# Patient Record
Sex: FEMALE | Race: WHITE | NOT HISPANIC OR LATINO | Employment: STUDENT | URBAN - METROPOLITAN AREA
[De-identification: names, ages, dates, MRNs, and addresses within clinical notes are randomized per-mention and may not be internally consistent; named-entity substitution may affect disease eponyms.]

---

## 2023-06-16 ENCOUNTER — APPOINTMENT (OUTPATIENT)
Dept: ULTRASOUND IMAGING | Facility: CLINIC | Age: 43
End: 2023-06-16
Attending: EMERGENCY MEDICINE
Payer: COMMERCIAL

## 2023-06-16 ENCOUNTER — HOSPITAL ENCOUNTER (EMERGENCY)
Facility: CLINIC | Age: 43
Discharge: HOME | End: 2023-06-16
Attending: EMERGENCY MEDICINE | Admitting: EMERGENCY MEDICINE
Payer: COMMERCIAL

## 2023-06-16 VITALS
OXYGEN SATURATION: 99 % | SYSTOLIC BLOOD PRESSURE: 110 MMHG | TEMPERATURE: 98 F | HEART RATE: 69 BPM | DIASTOLIC BLOOD PRESSURE: 55 MMHG | WEIGHT: 125 LBS | RESPIRATION RATE: 17 BRPM

## 2023-06-16 DIAGNOSIS — R10.11 ABDOMINAL PAIN, RIGHT UPPER QUADRANT: ICD-10-CM

## 2023-06-16 LAB
ALBUMIN SERPL-MCNC: 4.2 G/DL (ref 3.5–5)
ALP SERPL-CCNC: 49 U/L (ref 45–120)
ALT SERPL W P-5'-P-CCNC: 17 U/L (ref 0–45)
ANION GAP SERPL CALCULATED.3IONS-SCNC: 8 MMOL/L (ref 5–18)
AST SERPL W P-5'-P-CCNC: 20 U/L (ref 0–40)
BASOPHILS # BLD AUTO: 0 10E3/UL (ref 0–0.2)
BASOPHILS NFR BLD AUTO: 0 %
BILIRUB DIRECT SERPL-MCNC: 0.2 MG/DL
BILIRUB SERPL-MCNC: 0.6 MG/DL (ref 0–1)
BUN SERPL-MCNC: 10 MG/DL (ref 8–22)
CALCIUM SERPL-MCNC: 9.5 MG/DL (ref 8.5–10.5)
CHLORIDE BLD-SCNC: 104 MMOL/L (ref 98–107)
CO2 SERPL-SCNC: 28 MMOL/L (ref 22–31)
CREAT SERPL-MCNC: 0.75 MG/DL (ref 0.6–1.1)
EOSINOPHIL # BLD AUTO: 0.1 10E3/UL (ref 0–0.7)
EOSINOPHIL NFR BLD AUTO: 2 %
ERYTHROCYTE [DISTWIDTH] IN BLOOD BY AUTOMATED COUNT: 13.5 % (ref 10–15)
GFR SERPL CREATININE-BSD FRML MDRD: >90 ML/MIN/1.73M2
GLUCOSE BLD-MCNC: 83 MG/DL (ref 70–125)
HCG SERPL QL: NEGATIVE
HCT VFR BLD AUTO: 37.6 % (ref 35–47)
HGB BLD-MCNC: 12.7 G/DL (ref 11.7–15.7)
HOLD SPECIMEN: NORMAL
IMM GRANULOCYTES # BLD: 0 10E3/UL
IMM GRANULOCYTES NFR BLD: 1 %
LIPASE SERPL-CCNC: 53 U/L (ref 0–52)
LYMPHOCYTES # BLD AUTO: 1.7 10E3/UL (ref 0.8–5.3)
LYMPHOCYTES NFR BLD AUTO: 20 %
MCH RBC QN AUTO: 31.6 PG (ref 26.5–33)
MCHC RBC AUTO-ENTMCNC: 33.8 G/DL (ref 31.5–36.5)
MCV RBC AUTO: 94 FL (ref 78–100)
MONOCYTES # BLD AUTO: 0.5 10E3/UL (ref 0–1.3)
MONOCYTES NFR BLD AUTO: 6 %
NEUTROPHILS # BLD AUTO: 6 10E3/UL (ref 1.6–8.3)
NEUTROPHILS NFR BLD AUTO: 71 %
NRBC # BLD AUTO: 0 10E3/UL
NRBC BLD AUTO-RTO: 0 /100
PLATELET # BLD AUTO: 253 10E3/UL (ref 150–450)
POTASSIUM BLD-SCNC: 3.6 MMOL/L (ref 3.5–5)
PROT SERPL-MCNC: 6.6 G/DL (ref 6–8)
RBC # BLD AUTO: 4.02 10E6/UL (ref 3.8–5.2)
SODIUM SERPL-SCNC: 140 MMOL/L (ref 136–145)
WBC # BLD AUTO: 8.4 10E3/UL (ref 4–11)

## 2023-06-16 PROCEDURE — 84703 CHORIONIC GONADOTROPIN ASSAY: CPT | Performed by: EMERGENCY MEDICINE

## 2023-06-16 PROCEDURE — 82248 BILIRUBIN DIRECT: CPT | Performed by: EMERGENCY MEDICINE

## 2023-06-16 PROCEDURE — 80053 COMPREHEN METABOLIC PANEL: CPT | Performed by: EMERGENCY MEDICINE

## 2023-06-16 PROCEDURE — 99285 EMERGENCY DEPT VISIT HI MDM: CPT | Mod: 25

## 2023-06-16 PROCEDURE — 76705 ECHO EXAM OF ABDOMEN: CPT

## 2023-06-16 PROCEDURE — C9113 INJ PANTOPRAZOLE SODIUM, VIA: HCPCS | Performed by: EMERGENCY MEDICINE

## 2023-06-16 PROCEDURE — 85025 COMPLETE CBC W/AUTO DIFF WBC: CPT | Performed by: EMERGENCY MEDICINE

## 2023-06-16 PROCEDURE — 93005 ELECTROCARDIOGRAM TRACING: CPT | Performed by: EMERGENCY MEDICINE

## 2023-06-16 PROCEDURE — 36415 COLL VENOUS BLD VENIPUNCTURE: CPT | Performed by: EMERGENCY MEDICINE

## 2023-06-16 PROCEDURE — 250N000011 HC RX IP 250 OP 636: Performed by: EMERGENCY MEDICINE

## 2023-06-16 PROCEDURE — 96374 THER/PROPH/DIAG INJ IV PUSH: CPT

## 2023-06-16 PROCEDURE — 250N000013 HC RX MED GY IP 250 OP 250 PS 637: Performed by: EMERGENCY MEDICINE

## 2023-06-16 PROCEDURE — 83690 ASSAY OF LIPASE: CPT | Performed by: EMERGENCY MEDICINE

## 2023-06-16 RX ORDER — SUCRALFATE 1 G/1
1 TABLET ORAL 4 TIMES DAILY
Qty: 30 TABLET | Refills: 0 | Status: ON HOLD | OUTPATIENT
Start: 2023-06-16 | End: 2024-02-15

## 2023-06-16 RX ORDER — MAGNESIUM HYDROXIDE/ALUMINUM HYDROXICE/SIMETHICONE 120; 1200; 1200 MG/30ML; MG/30ML; MG/30ML
30 SUSPENSION ORAL ONCE
Status: COMPLETED | OUTPATIENT
Start: 2023-06-16 | End: 2023-06-16

## 2023-06-16 RX ORDER — OMEPRAZOLE 40 MG/1
40 CAPSULE, DELAYED RELEASE ORAL DAILY
Qty: 30 CAPSULE | Refills: 0 | Status: SHIPPED | OUTPATIENT
Start: 2023-06-16

## 2023-06-16 RX ADMIN — ALUMINUM HYDROXIDE, MAGNESIUM HYDROXIDE, AND DIMETHICONE 30 ML: 200; 20; 200 SUSPENSION ORAL at 21:26

## 2023-06-16 RX ADMIN — PANTOPRAZOLE SODIUM 40 MG: 40 INJECTION, POWDER, FOR SOLUTION INTRAVENOUS at 21:25

## 2023-06-16 ASSESSMENT — ACTIVITIES OF DAILY LIVING (ADL): ADLS_ACUITY_SCORE: 35

## 2023-06-16 NOTE — ED TRIAGE NOTES
pt c/o RUQ abdominal pain about 3 days ago and now radiating into back straight through to right mid back, getting worse today. Pt states most of the time the pain gets worse with eating. Denies nausea, denies emesis. Pt c/o pain also over right rib are and states tender if touching and with deep breath. Pt states still has gallbladder.

## 2023-06-17 LAB
ATRIAL RATE - MUSE: 74 BPM
DIASTOLIC BLOOD PRESSURE - MUSE: NORMAL MMHG
INTERPRETATION ECG - MUSE: NORMAL
P AXIS - MUSE: 64 DEGREES
PR INTERVAL - MUSE: 122 MS
QRS DURATION - MUSE: 82 MS
QT - MUSE: 396 MS
QTC - MUSE: 439 MS
R AXIS - MUSE: 88 DEGREES
SYSTOLIC BLOOD PRESSURE - MUSE: NORMAL MMHG
T AXIS - MUSE: 82 DEGREES
VENTRICULAR RATE- MUSE: 74 BPM

## 2023-06-17 NOTE — DISCHARGE INSTRUCTIONS
You were seen in the emergency department for pain in your right upper quadrant and around your right lower ribs.  Your evaluation today included a gallbladder ultrasound, liver tests, and other lab testing which so far has looked stable and reassuring.  Specifically we did not see any signs of cholecystitis or gallstones.  We discussed possibility of some additional work-up to include things like CT scan of your chest or abdomen and at this point you have elected to hold off on further testing.  We would like you to follow-up as soon as possible with your clinic.  There may be further evaluation is needed with gastroenterology and primary care as an outpatient.  We are going to prescribe you Carafate you can use for acid reflux/gastritis pain and we would like you to start omeprazole daily. You can use Tylenol 650 mg every 6 hours for general pain management.  We typically avoid NSAIDS like ibuprofen and naproxen if we are concerned about GI upset.  If you have severe worsening of symptoms particularly with any fever, severe breathing difficulties, abdominal pain particularly migrating further down your abdomen, we need to reevaluate you in the ED right away.

## 2023-06-17 NOTE — ED PROVIDER NOTES
EMERGENCY DEPARTMENT ENCOUNTER      NAME: Magdalene Mensah  AGE: 42 year old female  YOB: 1980  MRN: [id number]  EVALUATION DATE & TIME: 2023  7:48 PM    PCP: No Ref-Primary, Physician    ED PROVIDER: Niall Christensen M.D.      Chief Complaint   Patient presents with     Abdominal Pain       FINAL IMPRESSION:  1. Abdominal pain, right upper quadrant        ED COURSE & MEDICAL DECISION MAKIN year old female presents to the Emergency Department for evaluation of pain in her right upper abdomen and right lower ribs.  She is vitally stable when she arrives to the emergency department.  Abdominal exam is notable for minimal right upper quadrant tenderness with deep palpation under the rib margin.  The remainder of the abdomen is completely nontender.  Cardiopulmonary exam is otherwise unremarkable, oximetry is stable in the upper 90s and she is breathing comfortably.  She does splint slightly with deep inspiration.  She underwent evaluation as below which included a right upper quadrant ultrasound, liver function tests, lipase, white blood cell count which are all stable and with no evidence of choledocholithiasis or cholecystitis on her evaluation so far.  With the remainder of her abdomen being completely nontender I do not have any suspicion for anything like appendicitis or perforated ulcer.  We did discuss that with right lower rib margin pain may be other possibilities like pulmonary embolism, pneumothorax, pneumonia, etc.  Patient thinks this is less likely given her normal breathing, normal vital signs and oxygen levels and although we discussed possible additional work-up including D-dimer and CT scan, patient was not interested in this, preferring instead to focus on antacid medications, analgesia, and follow-up in clinic to continue work-up possibly to include consultation with gastroenterology.  I think this approach is reasonable with the understanding she will need to return if  she develops any severe worsening jossue pain particularly migrating to different areas, shortness of breath, productive cough, or other immediate concern.  Patient was discharged in stable condition.    At the conclusion of the encounter I discussed the results of all of the tests and the disposition. The questions were answered. The patient or family acknowledged understanding and was agreeable with the care plan.       Medical Decision Making    History:    Supplemental history from: Documented in chart, if applicable and N/A    External Record(s) reviewed: Documented in chart, if applicable. and Outpatient Record: I reviewed the patients outpatient records.    Work Up:    Chart documentation includes differential considered and any EKGs or imaging independently interpreted by provider, where specified.    In additional to work up documented, I considered the following work up: Documented in chart, if applicable.    External consultation:    Discussion of management with another provider: Documented in chart, if applicable    Complicating factors:    Care impacted by chronic illness: N/A    Care affected by social determinants of health: N/A    Disposition considerations: Discharge. I prescribed additional prescription strength medication(s) as charted. See documentation for any additional details.            MEDICATIONS GIVEN IN THE EMERGENCY:  Medications   pantoprazole (PROTONIX) IV push injection 40 mg (40 mg Intravenous $Given 6/16/23 2125)   alum & mag hydroxide-simethicone (MAALOX) suspension 30 mL (30 mLs Oral $Given 6/16/23 2126)       NEW PRESCRIPTIONS STARTED AT TODAY'S ER VISIT  Discharge Medication List as of 6/16/2023  9:28 PM      START taking these medications    Details   omeprazole (PRILOSEC) 40 MG DR capsule Take 1 capsule (40 mg) by mouth daily, Disp-30 capsule, R-0, Local Print      sucralfate (CARAFATE) 1 GM tablet Take 1 tablet (1 g) by mouth 4 times daily, Disp-30 tablet, R-0, Local Print                 =================================================================    HPI    Patient information was obtained from: patient    Use of : N/A         Magdalene Mensah is a 42 year old female with no pertinent history who presents to this ED via walk in for evaluation of abdominal pain    For the past 4-5 days the patient has endorsed the sudden onset of right sided chest pain underneath her breast line that radiates into the right upper quadrant of her abdomen. She notes that the pain feels like a shooting pain that is worse with deep breaths. Additionally, the patient notes that eating makes her pain worse and causes her to endorse a cramp like pain.     The patient notes that over the past few months she has endorsed increased stress and lost approximately 15-20 pounds. She notes that she was not eating much at that time and believes that this is unrelated to her current pain.     She reports previous c-sections but denies any other abdominal surgeries. The patient denies any nausea, vomiting, fevers, shortness of breath, leg swelling, or any other complaints.     REVIEW OF SYSTEMS   All systems reviewed and negative except as noted in HPI.    PAST MEDICAL HISTORY:  No past medical history on file.    PAST SURGICAL HISTORY:  No past surgical history on file.        CURRENT MEDICATIONS:    No current facility-administered medications for this encounter.     Current Outpatient Medications   Medication     omeprazole (PRILOSEC) 40 MG DR capsule     sucralfate (CARAFATE) 1 GM tablet         ALLERGIES:  No Known Allergies    FAMILY HISTORY:  No family history on file.    SOCIAL HISTORY:   Social History     Socioeconomic History     Marital status:        VITALS:  /55   Pulse 69   Temp 98  F (36.7  C) (Temporal)   Resp 17   Wt 56.7 kg (125 lb)   LMP 06/12/2023   SpO2 99%     PHYSICAL EXAM    Constitutional: Well developed, Well nourished, NAD.  HENT: Normocephalic, Atraumatic.  Neck Supple.  Eyes: EOMI, Conjunctiva normal.  Respiratory: Breathing comfortably on room air. Speaks full sentences easily. Lungs clear to ascultation.  Cardiovascular: Normal heart rate, Regular rhythm. No peripheral edema.  Abdomen: Soft, mild tenderness to deep palpation in the right upper quadrant underneath the right rib margin.  Remainder the abdomen is completely nontender.  Musculoskeletal: Good range of motion in all major joints. No major deformities noted.  Integument: Warm, Dry.  Neurologic: Alert & awake, Normal motor function, Normal sensory function, No focal deficits noted.   Psychiatric: Cooperative. Affect appropriate.     LAB:  All pertinent labs reviewed and interpreted.  Labs Ordered and Resulted from Time of ED Arrival to Time of ED Departure   LIPASE - Abnormal       Result Value    Lipase 53 (*)    BASIC METABOLIC PANEL - Normal    Sodium 140      Potassium 3.6      Chloride 104      Carbon Dioxide (CO2) 28      Anion Gap 8      Urea Nitrogen 10      Creatinine 0.75      Calcium 9.5      Glucose 83      GFR Estimate >90     HEPATIC FUNCTION PANEL - Normal    Bilirubin Total 0.6      Bilirubin Direct 0.2      Protein Total 6.6      Albumin 4.2      Alkaline Phosphatase 49      AST 20      ALT 17     HCG QUALITATIVE PREGNANCY - Normal    hCG Serum Qualitative Negative     CBC WITH PLATELETS AND DIFFERENTIAL    WBC Count 8.4      RBC Count 4.02      Hemoglobin 12.7      Hematocrit 37.6      MCV 94      MCH 31.6      MCHC 33.8      RDW 13.5      Platelet Count 253      % Neutrophils 71      % Lymphocytes 20      % Monocytes 6      % Eosinophils 2      % Basophils 0      % Immature Granulocytes 1      NRBCs per 100 WBC 0      Absolute Neutrophils 6.0      Absolute Lymphocytes 1.7      Absolute Monocytes 0.5      Absolute Eosinophils 0.1      Absolute Basophils 0.0      Absolute Immature Granulocytes 0.0      Absolute NRBCs 0.0         RADIOLOGY:  Reviewed all pertinent imaging. Please see  official radiology report.  Abdomen US, limited (RUQ only)   Final Result   IMPRESSION:   1.  No cholelithiasis or evidence of acute cholecystitis. No biliary dilatation.      2.  Simple hepatic cysts.                EKG:    Performed at: 1900    Impression: Sinus rhythm, normal ECG    Rate: 74  Rhythm: Sinus  Axis: Normal  AR Interval: 122  QRS Interval: 82  QTc Interval: 439  ST Changes: None significant  Comparison: None available    I have independently reviewed and interpreted the EKG(s) documented above.    PROCEDURES:   None      I, Renetta Joyce, am serving as a scribe to document services personally performed by Dr. Niall Christensen, based on my observation and the provider's statements to me. I, Niall Christensen MD attest that Renetta Joyce is acting in a scribe capacity, has observed my performance of the services and has documented them in accordance with my direction.    Niall Christensen M.D.  Emergency Medicine  [facility] EMERGENCY ROOM  [address] [id number] [phone]  Dept: [phone]       Niall Christensen MD  06/16/23 3971

## 2023-08-13 ENCOUNTER — HEALTH MAINTENANCE LETTER (OUTPATIENT)
Age: 43
End: 2023-08-13

## 2024-02-12 ENCOUNTER — APPOINTMENT (OUTPATIENT)
Dept: CT IMAGING | Facility: CLINIC | Age: 44
DRG: 872 | End: 2024-02-12
Attending: FAMILY MEDICINE
Payer: COMMERCIAL

## 2024-02-12 ENCOUNTER — APPOINTMENT (OUTPATIENT)
Dept: ULTRASOUND IMAGING | Facility: CLINIC | Age: 44
DRG: 872 | End: 2024-02-12
Attending: EMERGENCY MEDICINE
Payer: COMMERCIAL

## 2024-02-12 ENCOUNTER — HOSPITAL ENCOUNTER (INPATIENT)
Facility: CLINIC | Age: 44
LOS: 2 days | Discharge: HOME | DRG: 872 | End: 2024-02-15
Attending: EMERGENCY MEDICINE | Admitting: HOSPITALIST
Payer: COMMERCIAL

## 2024-02-12 DIAGNOSIS — N73.0 PID (ACUTE PELVIC INFLAMMATORY DISEASE): ICD-10-CM

## 2024-02-12 DIAGNOSIS — N70.93 PYOSALPINX: ICD-10-CM

## 2024-02-12 DIAGNOSIS — R12 HEART BURN: Primary | ICD-10-CM

## 2024-02-12 DIAGNOSIS — R65.10 SIRS (SYSTEMIC INFLAMMATORY RESPONSE SYNDROME) (H): ICD-10-CM

## 2024-02-12 LAB
ALBUMIN SERPL BCG-MCNC: 3.9 G/DL (ref 3.5–5.2)
ALBUMIN UR-MCNC: NEGATIVE MG/DL
ALP SERPL-CCNC: 60 U/L (ref 40–150)
ALT SERPL W P-5'-P-CCNC: 18 U/L (ref 0–50)
ANION GAP SERPL CALCULATED.3IONS-SCNC: 10 MMOL/L (ref 7–15)
APPEARANCE UR: CLEAR
AST SERPL W P-5'-P-CCNC: 21 U/L (ref 0–45)
BASOPHILS # BLD AUTO: 0.1 10E3/UL (ref 0–0.2)
BASOPHILS NFR BLD AUTO: 0 %
BILIRUB DIRECT SERPL-MCNC: <0.2 MG/DL (ref 0–0.3)
BILIRUB SERPL-MCNC: 0.7 MG/DL
BILIRUB UR QL STRIP: NEGATIVE
BUN SERPL-MCNC: 6.6 MG/DL (ref 6–20)
CALCIUM SERPL-MCNC: 9 MG/DL (ref 8.6–10)
CHLORIDE SERPL-SCNC: 104 MMOL/L (ref 98–107)
COLOR UR AUTO: ABNORMAL
CREAT SERPL-MCNC: 0.58 MG/DL (ref 0.51–0.95)
DEPRECATED HCO3 PLAS-SCNC: 21 MMOL/L (ref 22–29)
EGFRCR SERPLBLD CKD-EPI 2021: >90 ML/MIN/1.73M2
EOSINOPHIL # BLD AUTO: 0 10E3/UL (ref 0–0.7)
EOSINOPHIL NFR BLD AUTO: 0 %
ERYTHROCYTE [DISTWIDTH] IN BLOOD BY AUTOMATED COUNT: 13.2 % (ref 10–15)
GLUCOSE SERPL-MCNC: 126 MG/DL (ref 70–99)
GLUCOSE UR STRIP-MCNC: NEGATIVE MG/DL
HCG SERPL QL: NEGATIVE
HCT VFR BLD AUTO: 38.3 % (ref 35–47)
HGB BLD-MCNC: 13.2 G/DL (ref 11.7–15.7)
HGB UR QL STRIP: ABNORMAL
IMM GRANULOCYTES # BLD: 0.2 10E3/UL
IMM GRANULOCYTES NFR BLD: 1 %
KETONES UR STRIP-MCNC: ABNORMAL MG/DL
LEUKOCYTE ESTERASE UR QL STRIP: ABNORMAL
LIPASE SERPL-CCNC: 18 U/L (ref 13–60)
LYMPHOCYTES # BLD AUTO: 1.2 10E3/UL (ref 0.8–5.3)
LYMPHOCYTES NFR BLD AUTO: 6 %
MCH RBC QN AUTO: 31.3 PG (ref 26.5–33)
MCHC RBC AUTO-ENTMCNC: 34.5 G/DL (ref 31.5–36.5)
MCV RBC AUTO: 91 FL (ref 78–100)
MONOCYTES # BLD AUTO: 1.4 10E3/UL (ref 0–1.3)
MONOCYTES NFR BLD AUTO: 7 %
MUCOUS THREADS #/AREA URNS LPF: PRESENT /LPF
NEUTROPHILS # BLD AUTO: 17.9 10E3/UL (ref 1.6–8.3)
NEUTROPHILS NFR BLD AUTO: 86 %
NITRATE UR QL: NEGATIVE
NRBC # BLD AUTO: 0 10E3/UL
NRBC BLD AUTO-RTO: 0 /100
PH UR STRIP: 5.5 [PH] (ref 5–7)
PLATELET # BLD AUTO: 255 10E3/UL (ref 150–450)
POTASSIUM SERPL-SCNC: 3.7 MMOL/L (ref 3.4–5.3)
PROT SERPL-MCNC: 6.6 G/DL (ref 6.4–8.3)
RBC # BLD AUTO: 4.22 10E6/UL (ref 3.8–5.2)
RBC URINE: 5 /HPF
SODIUM SERPL-SCNC: 135 MMOL/L (ref 135–145)
SP GR UR STRIP: <1.005 (ref 1–1.03)
SQUAMOUS EPITHELIAL: 6 /HPF
UROBILINOGEN UR STRIP-MCNC: <2 MG/DL
WBC # BLD AUTO: 20.8 10E3/UL (ref 4–11)
WBC URINE: 3 /HPF

## 2024-02-12 PROCEDURE — 80053 COMPREHEN METABOLIC PANEL: CPT | Performed by: FAMILY MEDICINE

## 2024-02-12 PROCEDURE — 99285 EMERGENCY DEPT VISIT HI MDM: CPT | Mod: 25

## 2024-02-12 PROCEDURE — 74177 CT ABD & PELVIS W/CONTRAST: CPT

## 2024-02-12 PROCEDURE — 85025 COMPLETE CBC W/AUTO DIFF WBC: CPT | Performed by: FAMILY MEDICINE

## 2024-02-12 PROCEDURE — 250N000011 HC RX IP 250 OP 636: Performed by: EMERGENCY MEDICINE

## 2024-02-12 PROCEDURE — 36415 COLL VENOUS BLD VENIPUNCTURE: CPT | Performed by: FAMILY MEDICINE

## 2024-02-12 PROCEDURE — 76856 US EXAM PELVIC COMPLETE: CPT

## 2024-02-12 PROCEDURE — 82248 BILIRUBIN DIRECT: CPT | Performed by: FAMILY MEDICINE

## 2024-02-12 PROCEDURE — 96361 HYDRATE IV INFUSION ADD-ON: CPT

## 2024-02-12 PROCEDURE — 87491 CHLMYD TRACH DNA AMP PROBE: CPT | Performed by: EMERGENCY MEDICINE

## 2024-02-12 PROCEDURE — 87591 N.GONORRHOEAE DNA AMP PROB: CPT | Performed by: EMERGENCY MEDICINE

## 2024-02-12 PROCEDURE — 258N000003 HC RX IP 258 OP 636: Performed by: FAMILY MEDICINE

## 2024-02-12 PROCEDURE — 87086 URINE CULTURE/COLONY COUNT: CPT | Performed by: EMERGENCY MEDICINE

## 2024-02-12 PROCEDURE — 83690 ASSAY OF LIPASE: CPT | Performed by: FAMILY MEDICINE

## 2024-02-12 PROCEDURE — 76830 TRANSVAGINAL US NON-OB: CPT

## 2024-02-12 PROCEDURE — 81001 URINALYSIS AUTO W/SCOPE: CPT | Performed by: FAMILY MEDICINE

## 2024-02-12 PROCEDURE — 84703 CHORIONIC GONADOTROPIN ASSAY: CPT | Performed by: FAMILY MEDICINE

## 2024-02-12 PROCEDURE — 96375 TX/PRO/DX INJ NEW DRUG ADDON: CPT

## 2024-02-12 RX ORDER — CEFTRIAXONE 1 G/1
1 INJECTION, POWDER, FOR SOLUTION INTRAMUSCULAR; INTRAVENOUS ONCE
Status: COMPLETED | OUTPATIENT
Start: 2024-02-12 | End: 2024-02-13

## 2024-02-12 RX ORDER — DOXYCYCLINE 100 MG/10ML
100 INJECTION, POWDER, LYOPHILIZED, FOR SOLUTION INTRAVENOUS ONCE
Status: COMPLETED | OUTPATIENT
Start: 2024-02-12 | End: 2024-02-13

## 2024-02-12 RX ORDER — HYDROMORPHONE HYDROCHLORIDE 1 MG/ML
0.5 INJECTION, SOLUTION INTRAMUSCULAR; INTRAVENOUS; SUBCUTANEOUS EVERY 30 MIN PRN
Status: DISCONTINUED | OUTPATIENT
Start: 2024-02-12 | End: 2024-02-13

## 2024-02-12 RX ORDER — SODIUM CHLORIDE 9 MG/ML
INJECTION, SOLUTION INTRAVENOUS CONTINUOUS
Status: DISCONTINUED | OUTPATIENT
Start: 2024-02-12 | End: 2024-02-13

## 2024-02-12 RX ORDER — HYDROMORPHONE HYDROCHLORIDE 1 MG/ML
0.5 INJECTION, SOLUTION INTRAMUSCULAR; INTRAVENOUS; SUBCUTANEOUS EVERY 30 MIN PRN
Status: COMPLETED | OUTPATIENT
Start: 2024-02-12 | End: 2024-02-13

## 2024-02-12 RX ORDER — KETOROLAC TROMETHAMINE 15 MG/ML
15 INJECTION, SOLUTION INTRAMUSCULAR; INTRAVENOUS ONCE
Status: COMPLETED | OUTPATIENT
Start: 2024-02-12 | End: 2024-02-12

## 2024-02-12 RX ORDER — ONDANSETRON 2 MG/ML
4 INJECTION INTRAMUSCULAR; INTRAVENOUS EVERY 30 MIN PRN
Status: DISCONTINUED | OUTPATIENT
Start: 2024-02-12 | End: 2024-02-15 | Stop reason: HOSPADM

## 2024-02-12 RX ORDER — IOPAMIDOL 755 MG/ML
90 INJECTION, SOLUTION INTRAVASCULAR ONCE
Status: COMPLETED | OUTPATIENT
Start: 2024-02-12 | End: 2024-02-12

## 2024-02-12 RX ADMIN — ONDANSETRON 4 MG: 2 INJECTION INTRAMUSCULAR; INTRAVENOUS at 22:59

## 2024-02-12 RX ADMIN — SODIUM CHLORIDE 1000 ML: 9 INJECTION, SOLUTION INTRAVENOUS at 21:51

## 2024-02-12 RX ADMIN — IOPAMIDOL 90 ML: 755 INJECTION, SOLUTION INTRAVENOUS at 22:35

## 2024-02-12 RX ADMIN — KETOROLAC TROMETHAMINE 15 MG: 15 INJECTION, SOLUTION INTRAMUSCULAR; INTRAVENOUS at 21:51

## 2024-02-12 RX ADMIN — HYDROMORPHONE HYDROCHLORIDE 0.5 MG: 1 INJECTION, SOLUTION INTRAMUSCULAR; INTRAVENOUS; SUBCUTANEOUS at 23:00

## 2024-02-12 ASSESSMENT — ACTIVITIES OF DAILY LIVING (ADL): ADLS_ACUITY_SCORE: 35

## 2024-02-12 ASSESSMENT — ENCOUNTER SYMPTOMS
ABDOMINAL PAIN: 1
NAUSEA: 1
HEMATURIA: 0
FLANK PAIN: 1

## 2024-02-12 NOTE — LETTER
[facility] EMERGENCY ROOM  [address] [address]  Phone: [phone]  Fax: [phone]    February 13, 2024        Magdalene Mensah  [address] [address]          To whom it may concern:    RE: Magdalene Mensah    Patient was seen and treated today at our institution on the date of 2/12/2024 and 2/13/2024.    Please contact me for questions or concerns.      Sincerely,      Melchor Garcia MD

## 2024-02-13 PROBLEM — N73.0 PID (ACUTE PELVIC INFLAMMATORY DISEASE): Status: ACTIVE | Noted: 2024-02-13

## 2024-02-13 PROBLEM — R65.10 SIRS (SYSTEMIC INFLAMMATORY RESPONSE SYNDROME) (H): Status: ACTIVE | Noted: 2024-02-13

## 2024-02-13 PROBLEM — N70.93 PYOSALPINX: Status: ACTIVE | Noted: 2024-02-13

## 2024-02-13 LAB
C TRACH DNA SPEC QL NAA+PROBE: NEGATIVE
ERYTHROCYTE [DISTWIDTH] IN BLOOD BY AUTOMATED COUNT: 13.2 % (ref 10–15)
HCT VFR BLD AUTO: 33.4 % (ref 35–47)
HGB BLD-MCNC: 11.3 G/DL (ref 11.7–15.7)
MCH RBC QN AUTO: 31.6 PG (ref 26.5–33)
MCHC RBC AUTO-ENTMCNC: 33.8 G/DL (ref 31.5–36.5)
MCV RBC AUTO: 93 FL (ref 78–100)
N GONORRHOEA DNA SPEC QL NAA+PROBE: NEGATIVE
PLATELET # BLD AUTO: 215 10E3/UL (ref 150–450)
RBC # BLD AUTO: 3.58 10E6/UL (ref 3.8–5.2)
WBC # BLD AUTO: 17.5 10E3/UL (ref 4–11)

## 2024-02-13 PROCEDURE — 36415 COLL VENOUS BLD VENIPUNCTURE: CPT | Performed by: HOSPITALIST

## 2024-02-13 PROCEDURE — 96376 TX/PRO/DX INJ SAME DRUG ADON: CPT

## 2024-02-13 PROCEDURE — 120N000001 HC R&B MED SURG/OB

## 2024-02-13 PROCEDURE — 250N000011 HC RX IP 250 OP 636: Performed by: STUDENT IN AN ORGANIZED HEALTH CARE EDUCATION/TRAINING PROGRAM

## 2024-02-13 PROCEDURE — 96365 THER/PROPH/DIAG IV INF INIT: CPT

## 2024-02-13 PROCEDURE — 258N000003 HC RX IP 258 OP 636: Performed by: HOSPITALIST

## 2024-02-13 PROCEDURE — 258N000003 HC RX IP 258 OP 636: Performed by: EMERGENCY MEDICINE

## 2024-02-13 PROCEDURE — 96367 TX/PROPH/DG ADDL SEQ IV INF: CPT

## 2024-02-13 PROCEDURE — 250N000013 HC RX MED GY IP 250 OP 250 PS 637: Performed by: STUDENT IN AN ORGANIZED HEALTH CARE EDUCATION/TRAINING PROGRAM

## 2024-02-13 PROCEDURE — G0378 HOSPITAL OBSERVATION PER HR: HCPCS

## 2024-02-13 PROCEDURE — 250N000011 HC RX IP 250 OP 636: Performed by: HOSPITALIST

## 2024-02-13 PROCEDURE — 250N000011 HC RX IP 250 OP 636: Performed by: EMERGENCY MEDICINE

## 2024-02-13 PROCEDURE — 96366 THER/PROPH/DIAG IV INF ADDON: CPT

## 2024-02-13 PROCEDURE — 250N000013 HC RX MED GY IP 250 OP 250 PS 637: Performed by: HOSPITALIST

## 2024-02-13 PROCEDURE — 85027 COMPLETE CBC AUTOMATED: CPT | Performed by: HOSPITALIST

## 2024-02-13 PROCEDURE — 99222 1ST HOSP IP/OBS MODERATE 55: CPT | Performed by: HOSPITALIST

## 2024-02-13 PROCEDURE — 258N000003 HC RX IP 258 OP 636: Performed by: STUDENT IN AN ORGANIZED HEALTH CARE EDUCATION/TRAINING PROGRAM

## 2024-02-13 RX ORDER — DEXTROAMPHETAMINE SACCHARATE, AMPHETAMINE ASPARTATE, DEXTROAMPHETAMINE SULFATE AND AMPHETAMINE SULFATE 2.5; 2.5; 2.5; 2.5 MG/1; MG/1; MG/1; MG/1
5-10 TABLET ORAL
COMMUNITY

## 2024-02-13 RX ORDER — SODIUM CHLORIDE, SODIUM LACTATE, POTASSIUM CHLORIDE, CALCIUM CHLORIDE 600; 310; 30; 20 MG/100ML; MG/100ML; MG/100ML; MG/100ML
INJECTION, SOLUTION INTRAVENOUS CONTINUOUS
Status: DISCONTINUED | OUTPATIENT
Start: 2024-02-13 | End: 2024-02-14

## 2024-02-13 RX ORDER — ONDANSETRON 2 MG/ML
4 INJECTION INTRAMUSCULAR; INTRAVENOUS EVERY 6 HOURS PRN
Status: DISCONTINUED | OUTPATIENT
Start: 2024-02-13 | End: 2024-02-15 | Stop reason: HOSPADM

## 2024-02-13 RX ORDER — ACETAMINOPHEN 650 MG/1
650 SUPPOSITORY RECTAL EVERY 4 HOURS PRN
Status: DISCONTINUED | OUTPATIENT
Start: 2024-02-13 | End: 2024-02-15 | Stop reason: HOSPADM

## 2024-02-13 RX ORDER — AMOXICILLIN 250 MG
2 CAPSULE ORAL 2 TIMES DAILY PRN
Status: DISCONTINUED | OUTPATIENT
Start: 2024-02-13 | End: 2024-02-15 | Stop reason: HOSPADM

## 2024-02-13 RX ORDER — SODIUM CHLORIDE 9 MG/ML
INJECTION, SOLUTION INTRAVENOUS CONTINUOUS
Status: DISCONTINUED | OUTPATIENT
Start: 2024-02-13 | End: 2024-02-13

## 2024-02-13 RX ORDER — PROCHLORPERAZINE MALEATE 10 MG
10 TABLET ORAL EVERY 6 HOURS PRN
Status: DISCONTINUED | OUTPATIENT
Start: 2024-02-13 | End: 2024-02-15 | Stop reason: HOSPADM

## 2024-02-13 RX ORDER — ESCITALOPRAM OXALATE 10 MG/1
10 TABLET ORAL DAILY
Status: DISCONTINUED | OUTPATIENT
Start: 2024-02-13 | End: 2024-02-15 | Stop reason: HOSPADM

## 2024-02-13 RX ORDER — DOXYCYCLINE 100 MG/1
100 CAPSULE ORAL EVERY 12 HOURS SCHEDULED
Status: DISCONTINUED | OUTPATIENT
Start: 2024-02-13 | End: 2024-02-15 | Stop reason: HOSPADM

## 2024-02-13 RX ORDER — DEXTROAMPHETAMINE SACCHARATE, AMPHETAMINE ASPARTATE, DEXTROAMPHETAMINE SULFATE AND AMPHETAMINE SULFATE 2.5; 2.5; 2.5; 2.5 MG/1; MG/1; MG/1; MG/1
10 TABLET ORAL EVERY MORNING
Status: DISCONTINUED | OUTPATIENT
Start: 2024-02-14 | End: 2024-02-15 | Stop reason: HOSPADM

## 2024-02-13 RX ORDER — QUETIAPINE FUMARATE 25 MG/1
12.5-75 TABLET, FILM COATED ORAL
COMMUNITY

## 2024-02-13 RX ORDER — VALACYCLOVIR HYDROCHLORIDE 1 G/1
1000 TABLET, FILM COATED ORAL 2 TIMES DAILY PRN
COMMUNITY

## 2024-02-13 RX ORDER — SUCRALFATE 1 G/1
1 TABLET ORAL 4 TIMES DAILY
Status: DISCONTINUED | OUTPATIENT
Start: 2024-02-13 | End: 2024-02-13

## 2024-02-13 RX ORDER — IBUPROFEN 400 MG/1
400 TABLET, FILM COATED ORAL EVERY 6 HOURS
Status: DISCONTINUED | OUTPATIENT
Start: 2024-02-13 | End: 2024-02-15 | Stop reason: HOSPADM

## 2024-02-13 RX ORDER — DEXTROAMPHETAMINE SACCHARATE, AMPHETAMINE ASPARTATE MONOHYDRATE, DEXTROAMPHETAMINE SULFATE AND AMPHETAMINE SULFATE 2.5; 2.5; 2.5; 2.5 MG/1; MG/1; MG/1; MG/1
10 CAPSULE, EXTENDED RELEASE ORAL EVERY MORNING
COMMUNITY

## 2024-02-13 RX ORDER — CEFTRIAXONE 1 G/1
1 INJECTION, POWDER, FOR SOLUTION INTRAMUSCULAR; INTRAVENOUS EVERY 24 HOURS
Status: DISCONTINUED | OUTPATIENT
Start: 2024-02-14 | End: 2024-02-15 | Stop reason: HOSPADM

## 2024-02-13 RX ORDER — ESCITALOPRAM OXALATE 10 MG/1
10 TABLET ORAL DAILY
COMMUNITY

## 2024-02-13 RX ORDER — METRONIDAZOLE 500 MG/100ML
500 INJECTION, SOLUTION INTRAVENOUS EVERY 12 HOURS
Status: DISCONTINUED | OUTPATIENT
Start: 2024-02-13 | End: 2024-02-15

## 2024-02-13 RX ORDER — SIMETHICONE 80 MG
80 TABLET,CHEWABLE ORAL EVERY 6 HOURS PRN
Status: DISCONTINUED | OUTPATIENT
Start: 2024-02-13 | End: 2024-02-15 | Stop reason: HOSPADM

## 2024-02-13 RX ORDER — HYDROMORPHONE HCL IN WATER/PF 6 MG/30 ML
0.2 PATIENT CONTROLLED ANALGESIA SYRINGE INTRAVENOUS EVERY 4 HOURS PRN
Status: DISCONTINUED | OUTPATIENT
Start: 2024-02-13 | End: 2024-02-15 | Stop reason: HOSPADM

## 2024-02-13 RX ORDER — DEXTROAMPHETAMINE SACCHARATE, AMPHETAMINE ASPARTATE MONOHYDRATE, DEXTROAMPHETAMINE SULFATE AND AMPHETAMINE SULFATE 2.5; 2.5; 2.5; 2.5 MG/1; MG/1; MG/1; MG/1
10 CAPSULE, EXTENDED RELEASE ORAL EVERY MORNING
Status: DISCONTINUED | OUTPATIENT
Start: 2024-02-14 | End: 2024-02-15 | Stop reason: HOSPADM

## 2024-02-13 RX ORDER — ONDANSETRON 4 MG/1
4 TABLET, ORALLY DISINTEGRATING ORAL EVERY 6 HOURS PRN
Status: DISCONTINUED | OUTPATIENT
Start: 2024-02-13 | End: 2024-02-15 | Stop reason: HOSPADM

## 2024-02-13 RX ORDER — POLYETHYLENE GLYCOL 3350 17 G/17G
17 POWDER, FOR SOLUTION ORAL DAILY
Status: DISCONTINUED | OUTPATIENT
Start: 2024-02-13 | End: 2024-02-15 | Stop reason: HOSPADM

## 2024-02-13 RX ORDER — PANTOPRAZOLE SODIUM 40 MG/1
40 TABLET, DELAYED RELEASE ORAL
Status: DISCONTINUED | OUTPATIENT
Start: 2024-02-13 | End: 2024-02-15 | Stop reason: HOSPADM

## 2024-02-13 RX ORDER — HYDROMORPHONE HYDROCHLORIDE 1 MG/ML
0.5 INJECTION, SOLUTION INTRAMUSCULAR; INTRAVENOUS; SUBCUTANEOUS EVERY 4 HOURS PRN
Status: DISCONTINUED | OUTPATIENT
Start: 2024-02-13 | End: 2024-02-13

## 2024-02-13 RX ORDER — HYDROCORTISONE 2.5 %
CREAM (GRAM) TOPICAL 2 TIMES DAILY PRN
COMMUNITY

## 2024-02-13 RX ORDER — ACETAMINOPHEN 325 MG/1
650 TABLET ORAL EVERY 4 HOURS PRN
Status: DISCONTINUED | OUTPATIENT
Start: 2024-02-13 | End: 2024-02-15 | Stop reason: HOSPADM

## 2024-02-13 RX ORDER — DOCUSATE SODIUM 100 MG/1
100 CAPSULE, LIQUID FILLED ORAL 2 TIMES DAILY
Status: DISCONTINUED | OUTPATIENT
Start: 2024-02-13 | End: 2024-02-15 | Stop reason: HOSPADM

## 2024-02-13 RX ORDER — DEXTROAMPHETAMINE SACCHARATE, AMPHETAMINE ASPARTATE, DEXTROAMPHETAMINE SULFATE AND AMPHETAMINE SULFATE 2.5; 2.5; 2.5; 2.5 MG/1; MG/1; MG/1; MG/1
10 TABLET ORAL EVERY MORNING
COMMUNITY

## 2024-02-13 RX ORDER — OXYCODONE HYDROCHLORIDE 5 MG/1
5 TABLET ORAL EVERY 6 HOURS PRN
Status: DISCONTINUED | OUTPATIENT
Start: 2024-02-13 | End: 2024-02-15 | Stop reason: HOSPADM

## 2024-02-13 RX ORDER — DEXTROAMPHETAMINE SACCHARATE, AMPHETAMINE ASPARTATE, DEXTROAMPHETAMINE SULFATE AND AMPHETAMINE SULFATE 1.25; 1.25; 1.25; 1.25 MG/1; MG/1; MG/1; MG/1
5-10 TABLET ORAL
Status: DISCONTINUED | OUTPATIENT
Start: 2024-02-14 | End: 2024-02-15 | Stop reason: HOSPADM

## 2024-02-13 RX ORDER — KETOROLAC TROMETHAMINE 15 MG/ML
15 INJECTION, SOLUTION INTRAMUSCULAR; INTRAVENOUS EVERY 6 HOURS PRN
Status: DISCONTINUED | OUTPATIENT
Start: 2024-02-13 | End: 2024-02-13

## 2024-02-13 RX ORDER — AMOXICILLIN 250 MG
1 CAPSULE ORAL 2 TIMES DAILY PRN
Status: DISCONTINUED | OUTPATIENT
Start: 2024-02-13 | End: 2024-02-15 | Stop reason: HOSPADM

## 2024-02-13 RX ORDER — PROCHLORPERAZINE 25 MG
25 SUPPOSITORY, RECTAL RECTAL EVERY 12 HOURS PRN
Status: DISCONTINUED | OUTPATIENT
Start: 2024-02-13 | End: 2024-02-15 | Stop reason: HOSPADM

## 2024-02-13 RX ADMIN — HYDROMORPHONE HYDROCHLORIDE 0.5 MG: 1 INJECTION, SOLUTION INTRAMUSCULAR; INTRAVENOUS; SUBCUTANEOUS at 06:50

## 2024-02-13 RX ADMIN — HYDROMORPHONE HYDROCHLORIDE 0.2 MG: 0.2 INJECTION, SOLUTION INTRAMUSCULAR; INTRAVENOUS; SUBCUTANEOUS at 19:40

## 2024-02-13 RX ADMIN — METRONIDAZOLE 500 MG: 500 INJECTION, SOLUTION INTRAVENOUS at 14:04

## 2024-02-13 RX ADMIN — SODIUM CHLORIDE, POTASSIUM CHLORIDE, SODIUM LACTATE AND CALCIUM CHLORIDE: 600; 310; 30; 20 INJECTION, SOLUTION INTRAVENOUS at 16:27

## 2024-02-13 RX ADMIN — IBUPROFEN 400 MG: 400 TABLET, FILM COATED ORAL at 14:04

## 2024-02-13 RX ADMIN — ESCITALOPRAM OXALATE 10 MG: 10 TABLET ORAL at 14:04

## 2024-02-13 RX ADMIN — HYDROMORPHONE HYDROCHLORIDE 0.5 MG: 1 INJECTION, SOLUTION INTRAMUSCULAR; INTRAVENOUS; SUBCUTANEOUS at 01:01

## 2024-02-13 RX ADMIN — DOXYCYCLINE 100 MG: 100 INJECTION, POWDER, LYOPHILIZED, FOR SOLUTION INTRAVENOUS at 00:52

## 2024-02-13 RX ADMIN — SODIUM CHLORIDE 1000 ML: 9 INJECTION, SOLUTION INTRAVENOUS at 00:32

## 2024-02-13 RX ADMIN — ACETAMINOPHEN 650 MG: 325 TABLET ORAL at 21:52

## 2024-02-13 RX ADMIN — DOXYCYCLINE HYCLATE 100 MG: 100 CAPSULE ORAL at 08:10

## 2024-02-13 RX ADMIN — SIMETHICONE 80 MG: 80 TABLET, CHEWABLE ORAL at 15:22

## 2024-02-13 RX ADMIN — IBUPROFEN 400 MG: 400 TABLET, FILM COATED ORAL at 19:37

## 2024-02-13 RX ADMIN — PANTOPRAZOLE SODIUM 40 MG: 40 TABLET, DELAYED RELEASE ORAL at 06:45

## 2024-02-13 RX ADMIN — SODIUM CHLORIDE: 9 INJECTION, SOLUTION INTRAVENOUS at 03:44

## 2024-02-13 RX ADMIN — METRONIDAZOLE 500 MG: 500 INJECTION, SOLUTION INTRAVENOUS at 02:37

## 2024-02-13 RX ADMIN — ACETAMINOPHEN 650 MG: 325 TABLET ORAL at 15:22

## 2024-02-13 RX ADMIN — SODIUM CHLORIDE, POTASSIUM CHLORIDE, SODIUM LACTATE AND CALCIUM CHLORIDE: 600; 310; 30; 20 INJECTION, SOLUTION INTRAVENOUS at 23:25

## 2024-02-13 RX ADMIN — CEFTRIAXONE 1 G: 1 INJECTION, POWDER, FOR SOLUTION INTRAMUSCULAR; INTRAVENOUS at 00:33

## 2024-02-13 RX ADMIN — SODIUM CHLORIDE: 9 INJECTION, SOLUTION INTRAVENOUS at 01:04

## 2024-02-13 RX ADMIN — DOXYCYCLINE HYCLATE 100 MG: 100 CAPSULE ORAL at 19:37

## 2024-02-13 RX ADMIN — SODIUM CHLORIDE, POTASSIUM CHLORIDE, SODIUM LACTATE AND CALCIUM CHLORIDE: 600; 310; 30; 20 INJECTION, SOLUTION INTRAVENOUS at 08:10

## 2024-02-13 RX ADMIN — POLYETHYLENE GLYCOL 3350 17 G: 17 POWDER, FOR SOLUTION ORAL at 08:10

## 2024-02-13 RX ADMIN — SIMETHICONE 80 MG: 80 TABLET, CHEWABLE ORAL at 21:52

## 2024-02-13 RX ADMIN — DOCUSATE SODIUM 100 MG: 100 CAPSULE, LIQUID FILLED ORAL at 08:10

## 2024-02-13 RX ADMIN — OXYCODONE HYDROCHLORIDE 5 MG: 5 TABLET ORAL at 17:50

## 2024-02-13 RX ADMIN — DOCUSATE SODIUM 100 MG: 100 CAPSULE, LIQUID FILLED ORAL at 19:37

## 2024-02-13 RX ADMIN — OXYCODONE HYDROCHLORIDE 5 MG: 5 TABLET ORAL at 11:59

## 2024-02-13 RX ADMIN — ACETAMINOPHEN 650 MG: 325 TABLET ORAL at 03:44

## 2024-02-13 RX ADMIN — IBUPROFEN 400 MG: 400 TABLET, FILM COATED ORAL at 08:09

## 2024-02-13 ASSESSMENT — ACTIVITIES OF DAILY LIVING (ADL)
ADLS_ACUITY_SCORE: 36
ADLS_ACUITY_SCORE: 35
ADLS_ACUITY_SCORE: 19
ADLS_ACUITY_SCORE: 35
ADLS_ACUITY_SCORE: 35
ADLS_ACUITY_SCORE: 19
ADLS_ACUITY_SCORE: 35
ADLS_ACUITY_SCORE: 19
ADLS_ACUITY_SCORE: 35

## 2024-02-13 NOTE — PROGRESS NOTES
Patient seen and examined.   Now only has pain when she moves.  She has not had a bowel movement not sure for how long.  Physical exam: Alert and arousable, dry mocosa and skin, diffuse abdominal tenderness, worst at right lower quadrant.     Plan: scheduled ibuprofen, prn oxycodone, IVF increased to 150ml/h, GYN consult, continue current abx.  Gonorrhea chlamydia pending.   Cibinqo Counseling: I discussed with the patient the risks of Cibinqo therapy including but not limited to common cold, nausea, headache, cold sores, increased blood CPK levels, dizziness, UTIs, fatigue, acne, and vomitting. Live vaccines should be avoided.  This medication has been linked to serious infections; higher rate of mortality; malignancy and lymphoproliferative disorders; major adverse cardiovascular events; thrombosis; thrombocytopenia and lymphopenia; lipid elevations; and retinal detachment.

## 2024-02-13 NOTE — PROGRESS NOTES
PRIMARY DIAGNOSIS: ACUTE PAIN  OUTPATIENT/OBSERVATION GOALS TO BE MET BEFORE DISCHARGE:  1. Pain Status: Improved but still requiring IV narcotics.    2. Return to near baseline physical activity: No    3. Cleared for discharge by consultants (if involved): No    Discharge Planner Nurse   Safe discharge environment identified: Yes  Barriers to discharge: Yes    Sleeping between cares. Abdominal pain continues. Denies nausea.         Entered by: Francisca Torres RN 02/13/2024 12:11 PM     Please review provider order for any additional goals.   Nurse to notify provider when observation goals have been met and patient is ready for discharge.

## 2024-02-13 NOTE — PROGRESS NOTES
PRIMARY DIAGNOSIS: ACUTE PAIN  OUTPATIENT/OBSERVATION GOALS TO BE MET BEFORE DISCHARGE:  1. Pain Status: Improved but still requiring IV narcotics.    2. Return to near baseline physical activity: No    3. Cleared for discharge by consultants (if involved): No    Discharge Planner Nurse   Safe discharge environment identified: Yes  Barriers to discharge: Yes    Sleeping. Abdominal pain continues.         Entered by: Francisca Torres RN 02/13/2024 12:11 PM     Please review provider order for any additional goals.   Nurse to notify provider when observation goals have been met and patient is ready for discharge.

## 2024-02-13 NOTE — PHARMACY-ADMISSION MEDICATION HISTORY
Pharmacist Admission Medication History    Admission medication history is complete. The information provided in this note is only as accurate as the sources available at the time of the update.    Medication reconciliation/reorder completed by provider prior to medication history? No    Information Source(s): Patient and CareEverywhere/SureScripts via in-person    Pertinent Information: splits regular release Adderall 20mg tabs to make 5-10mg doses    Changes made to PTA medication list:  Added: Adderall, escitalopram, hydrocort, quetiapine (not started yet) , valtrex  Deleted: none  Changed: none    Allergies reviewed with patient and updates made in EHR: yes    Medications available for use during hospital stay: NONE.     Medication History Completed By: Jan Ball [facility] 2/13/2024 8:30 AM    PTA Med List   Medication Sig Last Dose    amphetamine-dextroamphetamine (ADDERALL XR) 10 MG 24 hr capsule Take 10 mg by mouth every morning 10mg XR  with a 10 mg regular release in the morning 2/11/2024 at am    amphetamine-dextroamphetamine (ADDERALL) 10 MG tablet Take 10 mg by mouth every morning 10mg XR  with a 10 mg regular release  in the morning 2/11/2024 at am    amphetamine-dextroamphetamine (ADDERALL) 10 MG tablet Take 5-10 mg by mouth daily (with lunch) At noon 2/11/2024 at 1200    escitalopram (LEXAPRO) 10 MG tablet Take 10 mg by mouth daily 2/12/2024 at am    hydrocortisone 2.5 % cream Apply topically 2 times daily as needed for rash (face) 2/11/2024    omeprazole (PRILOSEC) 40 MG DR capsule Take 1 capsule (40 mg) by mouth daily 2/12/2024 at am    QUEtiapine (SEROQUEL) 25 MG tablet Take 12.5-75 mg by mouth nightly as needed not started yet at not started yet    sucralfate (CARAFATE) 1 GM tablet Take 1 tablet (1 g) by mouth 4 times daily 2/12/2024 at am    valACYclovir (VALTREX) 1000 mg tablet Take 1,000 mg by mouth 2 times daily as needed (cold sores) Past Month

## 2024-02-13 NOTE — H&P
[facility] MEDICINE ADMISSION HISTORY AND PHYSICAL     Brief Synopsis:     Magdalene Mensah is a 43 year old female who presented with complaints of right lower quadrant abdominal pain.    Medical history is notable for gastritis, prior C-sections x 3.    Initial evaluation revealed normal vital signs, white count of 20.8, urine without pyuria, CT abdomen with bilateral dilated fallopian tubes worrisome for tubo-ovarian abscess, constipation.  Pelvic ultrasound completed showed pyosalpinx versus metal salpinx on the right.  No drainable abscess.    Initial treatment included doxycycline, ceftriaxone, Toradol, IV fluids, Dilaudid.    Assessment and Plan:  Pyosalpinx versus hemosalpinx  Gonorrhea chlamydia pending  Continue ceftriaxone and doxycycline  Add metronidazole  No evidence of abscess      Clinically Significant Risk Factors Present on Admission                                      Disposition Plan      Expected Discharge Date: 02/14/2024               Chief Complaint Abdominal pain     HISTORY   Magdalene Mensah is a 43 year old female who presented with complaints of abdominal pain.    Per ED provider:  Magdalene Mensah is a 43 year old female with no pertinent past medical history, who presents to the ED via walk-in for the evaluation of abdominal pain.     Patient reports right lower quadrant abdominal pain that started about 24 hours ago. She reports pain radiates to her right groin and to her back. She also notes associating dark urine and nausea. Otherwise, she denies any hematuria. Patient denies history of kidney infection or stones. She still has her appendix. She is otherwise healthy. No allergies to medications. No other complaints at this time.    No chest pain, cough, dysuria, edema.  Does have a new sexual partner.  Never had pelvic inflammatory disease before.  Past Medical History     No past medical history on file.     Surgical History   History reviewed. No pertinent  surgical history.  Family History    History reviewed. No pertinent family history.   Social History        Allergies   No Known Allergies  Prior to Admission Medications      Prior to Admission Medications   Prescriptions Last Dose Informant Patient Reported? Taking?   omeprazole (PRILOSEC) 40 MG DR capsule   No No   Sig: Take 1 capsule (40 mg) by mouth daily   sucralfate (CARAFATE) 1 GM tablet   No No   Sig: Take 1 tablet (1 g) by mouth 4 times daily      Facility-Administered Medications: None      Review of Systems     A 12 point comprehensive review of systems was negative except as noted above in HPI.    PHYSICAL EXAMINATION     Vitals      Temp:  [98.3  F (36.8  C)-98.6  F (37  C)] 98.3  F (36.8  C)  Pulse:  [75-98] 79  Resp:  [20] 20  BP: ()/(50-59) 89/51  SpO2:  [96 %-99 %] 97 %    Examination   Physical Exam:    Gen: no acute distress, comfortable but very drowsy  ENT: no scleral icterus  Pulm: Breathing comfortably room air at rest  CV: regular rate and rhythm, no significant lower extremity pitting edema  Derm: Not pale, no jaundice  Psych: appropriate affect, drowsy      Pertinent Radiology     Radiology Results:   Recent Results (from the past 24 hour(s))   CT Abdomen Pelvis w Contrast    Narrative    EXAM: CT ABDOMEN PELVIS W CONTRAST  LOCATION: [facility]  DATE: 2/12/2024    INDICATION: RLQ pain.  COMPARISON: Abdominal ultrasound on 06/06/2023.  TECHNIQUE: CT scan of the abdomen and pelvis was performed after the injection of 90 mL Isovue-370 intravenously. Multiplanar reformats were obtained. Dose reduction techniques were used.    FINDINGS:   LOWER CHEST: Lung bases are clear.    ABDOMEN/PELVIS:    HEPATOBILIARY: Multiple hypodense foci in the liver, some can be characterized as simple cysts, while multiple others are subcentimeter and too small to characterize. The gallbladder is unremarkable.    PANCREAS: No main pancreatic ductal dilatation or definite solid  pancreatic mass.    SPLEEN: No splenomegaly.    ADRENAL GLANDS: No adrenal nodules.    KIDNEYS/BLADDER: No radiodense kidney/ureteral stones or hydronephrosis in either kidney. Multiple subcentimeter hypodense foci in the kidneys, are too small to characterize.    BOWEL: No abnormally dilated bowel loops. Moderate amount of stool throughout the colon, nonspecific, can be seen with constipation. The appendix is not definitely visualized.    PERITONEUM: Small amount of fluid in the pelvis, could be reactive.    PELVIC ORGANS: Bilateral dilated fallopian tube, worrisome for tubo-ovarian abscess. Divergent uterine horns, that can be seen with septate or bicornuate uterus. Possible cervical nabothian cysts.    VASCULATURE: Unremarkable.    LYMPH NODES: No significant abdominopelvic lymphadenopathy.    MUSCULOSKELETAL: No suspicious osseous lesion.      Impression    IMPRESSION:   1. Bilateral dilated fallopian tubes, worrisome for tubo-ovarian abscess, recommend further evaluation with pelvic ultrasound.  2. Moderate amount of stool throughout the colon, nonspecific, can be seen with constipation.  3. 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   US Pelvis Cmplt w Transvag & Doppler LmtPel Duplex Limited    Narrative    EXAM: US PELVIS COMPLETE W TRANSVAGINAL AND DOPPLER LIMITED  LOCATION: [facility]  DATE: 2024    INDICATION: Pelvic pain  COMPARISON: CT abdomen and pelvis today.  TECHNIQUE: Transabdominal scans were performed. Endovaginal ultrasound was performed to better visualize the adnexa. Color flow with spectral Doppler and waveform analysis performed.    FINDINGS:    UTERUS: 8.7 x 5.6 x 4.6 cm. Anteverted uterus. Post  section. No uterine mass.    ENDOMETRIUM: 8 mm. Homogeneous endometrial texture. Arcuate versus possible septate configuration. No fundal indentation visualized as would be expected with a bicornuate uterus.    RIGHT OVARY: 4.6 x  3.5 x 3.2 cm. A few small simple appearing thin-walled unilocular cysts of the right ovary consistent with follicles. Normal arterial and venous duplex flow of the right ovary demonstrated. Moderately dilated convoluted tubular   structure of the right adnexa containing echogenic material consistent with pyosalpinx versus hematosalpinx.    LEFT OVARY: 2.3 x 1.4 x 1.2 cm. Normal with color Doppler blood flow demonstrated. Arterial and venous Doppler waveforms difficult to demonstrate due to position. Mildly dilated and convoluted tubular anechoic structure of the left adnexa consistent with   hydrosalpinx.    No drainable abscess visualized. Small amount of free pelvic fluid.      Impression    IMPRESSION:    1.  Pyosalpinx versus hematosalpinx on the right. Hydrosalpinx on the left. Clinical correlation for signs of acute infection recommended.  2.  No drainable abscess.  3.  Arcuate versus septate configuration of the endometrium.             Stephan Abreu [facility]   Phone: #[phone]

## 2024-02-13 NOTE — CONSULTS
CONSULTATION - GYN    NAME: Magdalene Mensah   : 1980   MRN: [id number]      [facility]    ADMISSION DATE: 2024    PCP:  Kiana Kinsey Bonham     CHIEF COMPLAINT: Right lower quadrant pain    HPI: I have been requested by Dr. Garcia to evaluate Magdalene Mensah for tubo-ovarian abscess. Patient is a 43 year old G5,  admitted with right lower quadrant pain.  History is obtained through the patient and chart review.     Patient reports right lower quadrant pain that began worsening approximately 24 hours ago.  Pain became significant where she was not moving secondary to the pain.  Pain persisted in the right lower quadrant so she presented to the ER with concern for possible appendicitis.      Denies history of sexually transmitted diseases or PID.   She does have a new partner.  She has not been using condoms.    PAST MEDICAL HISTORY:  GERD  ADHD  Constipation  Gastritis or ulcers    PAST SURGICAL HISTORY:   x 3  Left wrist surgery  Tonsils and adenoids  D&C x 1    OBHx:   A2  C/S x 3. D+C x 1.     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 Last 10 years pap smears normal.   Denies history of STDs.     FHx:  Mom, Maternal Grandmother and Maternal great grand mother - Hx of breast cancer > 50.     SOCIAL HISTORY:  Social History     Socioeconomic History    Marital status:      Spouse name: Not on file    Number of children: Not on file    Years of education: Not on file    Highest education level: Not on file   Occupational History    Not on file   Tobacco Use    Smoking status: Not on file    Smokeless tobacco: Not on file   Substance and Sexual Activity    Alcohol use: Not on file    Drug use: Not on file    Sexual activity: Not on file   Other Topics Concern    Not on file   Social History Narrative    Not on file     Social Determinants of Health     Financial Resource Strain: Not on file   Food Insecurity: Not on file   Transportation Needs: Not on file   Physical  "Activity: Not on file   Stress: Not on file   Social Connections: Not on file   Interpersonal Safety: Not on file   Housing Stability: Not on file       MEDICATIONS:  Current Facility-Administered Medications   Medication    acetaminophen (TYLENOL) tablet 650 mg    Or    acetaminophen (TYLENOL) Suppository 650 mg    [START ON 2/14/2024] cefTRIAXone (ROCEPHIN) 1 g vial to attach to  mL bag for ADULTS or NS 50 mL bag for PEDS    docusate sodium (COLACE) capsule 100 mg    doxycycline hyclate (VIBRAMYCIN) capsule 100 mg    HYDROmorphone (DILAUDID) injection 0.2 mg    ibuprofen (ADVIL/MOTRIN) tablet 400 mg    lactated ringers infusion    metroNIDAZOLE (FLAGYL) infusion 500 mg    ondansetron (ZOFRAN ODT) ODT tab 4 mg    Or    ondansetron (ZOFRAN) injection 4 mg    ondansetron (ZOFRAN) injection 4 mg    oxyCODONE (ROXICODONE) tablet 5 mg    pantoprazole (PROTONIX) EC tablet 40 mg    polyethylene glycol (MIRALAX) Packet 17 g    prochlorperazine (COMPAZINE) injection 10 mg    Or    prochlorperazine (COMPAZINE) tablet 10 mg    Or    prochlorperazine (COMPAZINE) suppository 25 mg    senna-docusate (SENOKOT-S/PERICOLACE) 8.6-50 MG per tablet 1 tablet    Or    senna-docusate (SENOKOT-S/PERICOLACE) 8.6-50 MG per tablet 2 tablet     Current Outpatient Medications   Medication    omeprazole (PRILOSEC) 40 MG DR capsule    sucralfate (CARAFATE) 1 GM tablet     ALLERGIES:  No Known Allergies    REVIEW OF SYSTEMS    Negative except what is stated in the HPI    PHYSICAL EXAM:  BP 99/59 (BP Location: Right arm, Patient Position: Supine, Cuff Size: Adult Small)   Pulse 79   Temp 98.4  F (36.9  C) (Oral)   Resp 20   Ht 1.626 m (5' 4\")   Wt 57.2 kg (126 lb)   LMP 02/05/2024   SpO2 97%   BMI 21.63 kg/m     General Appearance: Alert, appropriate appearance for age. No acute distress,   HEENT : Grossly normal  Gastrointestinal: Diffuse tenderness. Right lower quadrant most tender  Lymphatic: Non-palpable nodes in inguinal " regions.,   Musculoskeletal Exam: Back IS non-tender, no cva tenderness, moving all four extremities  Skin: no rash or abnormalities,   Neurologic: Normal gait and speech, no tremor  Psychiatric: Alert and oriented, appropriate affect.    LABS  WBC: 20.8 --> 17.5      IMAGING:  CT A/P:   ABDOMEN/PELVIS:     HEPATOBILIARY: Multiple hypodense foci in the liver, some can be characterized as simple cysts, while multiple others are subcentimeter and too small to characterize. The gallbladder is unremarkable.     PANCREAS: No main pancreatic ductal dilatation or definite solid pancreatic mass.  SPLEEN: No splenomegaly.  ADRENAL GLANDS: No adrenal nodules.  KIDNEYS/BLADDER: No radiodense kidney/ureteral stones or hydronephrosis in either kidney. Multiple subcentimeter hypodense foci in the kidneys, are too small to characterize.  BOWEL: No abnormally dilated bowel loops. Moderate amount of stool throughout the colon, nonspecific, can be seen with constipation. The appendix is not definitely visualized.  PERITONEUM: Small amount of fluid in the pelvis, could be reactive.  PELVIC ORGANS: Bilateral dilated fallopian tube, worrisome for tubo-ovarian abscess. Divergent uterine horns, that can be seen with septate or bicornuate uterus. Possible cervical nabothian cysts.  VASCULATURE: Unremarkable.  LYMPH NODES: No significant abdominopelvic lymphadenopathy.  MUSCULOSKELETAL: No suspicious osseous lesion.                                                                   IMPRESSION:   1. Bilateral dilated fallopian tubes, worrisome for tubo-ovarian abscess, recommend further evaluation with pelvic ultrasound.  2. Moderate amount of stool throughout the colon, nonspecific, can be seen with constipation.  3. 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    TVUS:  UTERUS: 8.7 x 5.6 x 4.6 cm. Anteverted uterus. Post  section. No uterine mass.  ENDOMETRIUM: 8 mm. Homogeneous endometrial texture.  Arcuate versus possible septate configuration. No fundal indentation visualized as would be expected with a bicornuate uterus.  RIGHT OVARY: 4.6 x 3.5 x 3.2 cm. A few small simple appearing thin-walled unilocular cysts of the right ovary consistent with follicles. Normal arterial and venous duplex flow of the right ovary demonstrated. Moderately dilated convoluted tubular   structure of the right adnexa containing echogenic material consistent with pyosalpinx versus hematosalpinx.  LEFT OVARY: 2.3 x 1.4 x 1.2 cm. Normal with color Doppler blood flow demonstrated. Arterial and venous Doppler waveforms difficult to demonstrate due to position. Mildly dilated and convoluted tubular anechoic structure of the left adnexa consistent with   hydrosalpinx.     No drainable abscess visualized. Small amount of free pelvic fluid.                                                                IMPRESSION:    1.  Pyosalpinx versus hematosalpinx on the right. Hydrosalpinx on the left. Clinical correlation for signs of acute infection recommended.  2.  No drainable abscess.  3.  Arcuate versus septate configuration of the endometrium    IMPRESSION:  43 year old  female with right lower quadrant pain and concern for pyosalpinx.  She does have a hydrosalpinx on the left side.    RECOMMENDATIONS:  Agree with admission and antibiotics.  She currently is on ceftriaxone 1 g every 24 hours, doxycycline 100 mg p.o. BID and Flagyl 500 IV mg BID.  Recommend continuing for 24 to 72 hours. Will need out patient antibiotics of Metronidazole 500 mg BID and Doxycycline 100 mg for 14 days.   Gonorrhea and Chlamydia pending    Thank you for allowing us to participate in the care of this patient.  Please contact us with any questions/concerns.    Ioana Oshea MD   Office [phone]    CC: [facility], [facility]

## 2024-02-13 NOTE — ED TRIAGE NOTES
"Arrives to ED with c/o RLQ abd pain x24 hours. Tearful in triage. Hx of ulcers. Tried tylenol, omeprazole, sucralfate and simethicone without improvement. Denies N/V. Hx of  x3. Last BM \"a couple days ago\".      Triage Assessment (Adult)       Row Name [id number]          Triage Assessment    Airway WDL WDL        Skin Circulation/Temperature WDL    Skin Circulation/Temperature WDL WDL        Cardiac WDL    Cardiac WDL WDL        Peripheral/Neurovascular WDL    Peripheral Neurovascular WDL WDL        Cognitive/Neuro/Behavioral WDL    Cognitive/Neuro/Behavioral WDL WDL                     "

## 2024-02-13 NOTE — UTILIZATION REVIEW
Admission Status; Secondary Review Determination   Under the authority of the Utilization Management Committee, the utilization review process indicated a secondary review on Magdalene Mensah. The review outcome is based on review of the medical records, discussions with staff, and applying clinical experience noted on the date of the review.   (x) Inpatient Status Appropriate - This patient's medical care is consistent with medical management for inpatient care and reasonable inpatient medical practice.     RATIONALE FOR DETERMINATION   43-year-old female admitted with a white count of 20.8 and a CT of the abdomen showing bilateral dilated fallopian tubes concerning for tubo-ovarian abscess.  Pelvic ultrasound showed pyosalpinx on the right with hydrosalpinx on the left.  Started on IV antibiotics and gynecology consulted.  Recommend continuing current IV antibiotics for 24 to 72 hours.  White count still elevated today at 17.5.    At the time of admission with the information available to the attending physician more than 2 nights Hospital complex care was anticipated, based on patient risk of adverse outcome if treated as outpatient and complex care required. Inpatient admission is appropriate based on the Medicare guidelines.   The information on this document is developed by the utilization review team in order for the business office to ensure compliance. This only denotes the appropriateness of proper admission status and does not reflect the quality of care rendered.   The definitions of Inpatient Status and Observation Status used in making the determination above are those provided in the CMS Coverage Manual, Chapter 1 and Chapter 6, section 70.4.   Sincerely,   Austin Garcia MD  Utilization Review  Physician Advisor  [facility]

## 2024-02-13 NOTE — PROGRESS NOTES
PRIMARY DIAGNOSIS: SYSTEMIC INFLAMMATORY RESPONSE SYNDROME  OUTPATIENT/OBSERVATION GOALS TO BE MET BEFORE DISCHARGE:  1. Stable vital signs Yes  2. Tolerating diet:Yes  3. Pain controlled with oral pain medications:  Yes  4. Positive bowel sounds:  Yes  5. Voiding without difficulty:  Yes  6. Able to ambulate:  Yes  7. Provider specific discharge goals met:  No    Discharge Planner Nurse   Safe discharge environment identified: Yes  Barriers to discharge: Yes       Entered by: Zack Holguin RN 02/13/2024 3:28 AM     Please review provider order for any additional goals.   Nurse to notify provider when observation goals have been met and patient is ready for discharge.    Patient is A&O X4, has been mildly hypotensive, other VSS  on RA. C/o lower quadrat abdominal pain, denies SOB, chest tightness, dizziness, constipation or nausea.  Cranial nerves II and XII intact, reflex symmetric, sensation normal, gait normal.Lips and mucous membranes are dry, PERRLA good, conjugate gaze, nares are patent. CMS intact. No pronator drift, Dorsiflexion good.   Heart and Lung sounds are clear, diffused throughout. No coughing, S1/S2 normal.  GI/: Continent of B&B, has not had a bowel movement for 3 days, which is normal for her per patient.   WBC count elevated, Urine cultures, Chlamydia and Gonorrhea labs pending.  Patient calls appropriately.     [id number] hrs   FARZANEH Dixon

## 2024-02-13 NOTE — ED PROVIDER NOTES
NAME: Magdalene Mensah  AGE: 43 year old female  YOB: 1980  MRN: [id number]  EVALUATION DATE & TIME: No admission date for patient encounter.    PCP: Amel[facility]    ED PROVIDER: Janusz Chatterjee M.D.      Chief Complaint   Patient presents with    Abdominal Pain     FINAL IMPRESSION:  1. PID (acute pelvic inflammatory disease)    2. Pyosalpinx    3. SIRS (systemic inflammatory response syndrome) (H)      MEDICAL DECISION MAKIN:38 PM I met with the patient, obtained history, performed an initial exam, and discussed options and plan for diagnostics and treatment here in the ED.   11:19 PM I rechecked and updated patient on results. Discussed plan for ultrasound.  12:50 AM I rechecked and updated patient on results. Discussed plan for admission.   12:51 AM I spoke with Dr. Bee, OB/GYN.  1:03 AM I discussed case with Dr. Abreu, hospitalist, who accepts the patient for admission.    Patient was clinically assessed and consented to treatment. After assessment, medical decision making and workup were discussed with the patient. The patient was agreeable to plan for testing, workup, and treatment.  Pertinent Labs & Imaging studies reviewed. (See chart for details)       Medical Decision Making  Obtained supplemental history:Supplemental history obtained?: Documented in chart  Reviewed external records: External records reviewed?: Documented in chart  Care impacted by chronic illness:N/A  Care significantly affected by social determinants of health:Access to Medical Care  Did you consider but not order tests?: Work up considered but not performed and documented in chart, if applicable  Did you interpret images independently?: Independent interpretation of ECG and images noted in documentation, when applicable.  Consultation discussion with other provider:Did you involve another provider (consultant, , pharmacy, etc.)?: No  Admit.    Magdalene Mensah is a 43 year old female who  presents with lower abdominal pain.   Differential diagnosis I+ncludes but not limited to appendicitis, pyelonephritis, kidney stone, ovarian cyst.  Patient with right lower quadrant pain but also appears sweaty  with no fever here.  Patient likely with infectious process and concern for appendicitis possibly ruptured appendicitis.  Patient had labs drawn which were otherwise unremarkable except for a elevated white blood cell count.  Patient sent for CT scan which revealed bilateral fallopian tube inflammation and possible tubo-ovarian abscess.  Patient does confirm she has been sexually active without protection and could possibly have gonorrhea chlamydia.  Patient started on IV antibiotics of ceftriaxone and doxycycline.  IV fluids pushed and patient diaphoresis did break and pain control.  Patient was sent for ultrasound based on CT scan and ultrasound did not show tubo-ovarian abscess.  Patient discussed with gynecology who would not recommend surgery at this time but continue antibiotics.  Patient still requiring pain medication and after initial stable blood pressure did have a little bit of low blood pressure which resolved with fluids.  Patient was kept on fluids, IV antibiotics, and plan for admission.  Gynecology recommended consultation with admission to hospitalist.  Patient discussed with hospitalist for admission.    0 minutes of critical care time    MEDICATIONS GIVEN IN THE EMERGENCY:  Medications   ondansetron (ZOFRAN) injection 4 mg (4 mg Intravenous Not Given 2/12/24 2300)   HYDROmorphone (PF) (DILAUDID) injection 0.5 mg (0.5 mg Intravenous $Given 2/13/24 0101)   HYDROmorphone (PF) (DILAUDID) injection 0.5 mg (has no administration in time range)   senna-docusate (SENOKOT-S/PERICOLACE) 8.6-50 MG per tablet 1 tablet (has no administration in time range)     Or   senna-docusate (SENOKOT-S/PERICOLACE) 8.6-50 MG per tablet 2 tablet (has no administration in time range)   ondansetron (ZOFRAN ODT) ODT  tab 4 mg (has no administration in time range)     Or   ondansetron (ZOFRAN) injection 4 mg (has no administration in time range)   sodium chloride 0.9 % infusion ( Intravenous $New Bag 2/13/24 0344)   acetaminophen (TYLENOL) tablet 650 mg (650 mg Oral $Given 2/13/24 0344)     Or   acetaminophen (TYLENOL) Suppository 650 mg ( Rectal See Alternative 2/13/24 0344)   ketorolac (TORADOL) injection 15 mg (has no administration in time range)   prochlorperazine (COMPAZINE) injection 10 mg (has no administration in time range)     Or   prochlorperazine (COMPAZINE) tablet 10 mg (has no administration in time range)     Or   prochlorperazine (COMPAZINE) suppository 25 mg (has no administration in time range)   metroNIDAZOLE (FLAGYL) infusion 500 mg (500 mg Intravenous $New Bag 2/13/24 0237)   cefTRIAXone (ROCEPHIN) 1 g vial to attach to  mL bag for ADULTS or NS 50 mL bag for PEDS (has no administration in time range)   doxycycline hyclate (VIBRAMYCIN) capsule 100 mg (has no administration in time range)   pantoprazole (PROTONIX) EC tablet 40 mg (has no administration in time range)   sodium chloride 0.9% BOLUS 1,000 mL (0 mLs Intravenous Stopped 2/12/24 2325)   ketorolac (TORADOL) injection 15 mg (15 mg Intravenous $Given 2/12/24 2151)   iopamidol (ISOVUE-370) solution 90 mL (90 mLs Intravenous $Given 2/12/24 2235)   cefTRIAXone (ROCEPHIN) 1 g vial to attach to  mL bag for ADULTS or NS 50 mL bag for PEDS (0 g Intravenous Stopped 2/13/24 0052)   doxycycline (VIBRAMYCIN) 100 mg vial to attach to  mL bag (0 mg Intravenous Stopped 2/13/24 0158)   sodium chloride 0.9% BOLUS 1,000 mL (0 mLs Intravenous Stopped 2/13/24 0103)       NEW PRESCRIPTIONS STARTED AT TODAY'S ER VISIT:  New Prescriptions    No medications on file          =================================================================    HPI    Patient information was obtained from: Patient    Use of : N/A         Magdalene Mensah is a 43 year  old female with no pertinent past medical history, who presents to the ED via walk-in for the evaluation of abdominal pain.    Patient reports right lower quadrant abdominal pain that started about 24 hours ago. She reports pain radiates to her right groin and to her back. She also notes associating dark urine and nausea. Otherwise, she denies any hematuria. Patient denies history of kidney infection or stones. She still has her appendix. She is otherwise healthy. No allergies to medications. No other complaints at this time.    REVIEW OF SYSTEMS   Review of Systems   Gastrointestinal:  Positive for abdominal pain (RLQ) and nausea.   Genitourinary:  Positive for flank pain (right). Negative for hematuria.        Positive for pain to right groin and dark urine   All other systems reviewed and are negative.       PAST MEDICAL HISTORY:  History reviewed. No pertinent past medical history.    PAST SURGICAL HISTORY:  History reviewed. No pertinent surgical history.    CURRENT MEDICATIONS:      Current Facility-Administered Medications:     acetaminophen (TYLENOL) tablet 650 mg, 650 mg, Oral, Q4H PRN, 650 mg at 02/13/24 0344 **OR** acetaminophen (TYLENOL) Suppository 650 mg, 650 mg, Rectal, Q4H PRN, Stephan Abreu DO    [START ON 2/14/2024] cefTRIAXone (ROCEPHIN) 1 g vial to attach to  mL bag for ADULTS or NS 50 mL bag for PEDS, 1 g, Intravenous, Q24H, Stephan Abreu DO    doxycycline hyclate (VIBRAMYCIN) capsule 100 mg, 100 mg, Oral, Q12H ADRIAN (08/20), Stephan Abreu DO    HYDROmorphone (PF) (DILAUDID) injection 0.5 mg, 0.5 mg, Intravenous, Q30 Min PRN, Janusz Chatterjee MD, 0.5 mg at 02/13/24 0101    HYDROmorphone (PF) (DILAUDID) injection 0.5 mg, 0.5 mg, Intravenous, Q30 Min PRN, Janusz Chatterjee MD    ketorolac (TORADOL) injection 15 mg, 15 mg, Intravenous, Q6H PRN, Stephan Abreu DO    metroNIDAZOLE (FLAGYL) infusion 500 mg, 500 mg, Intravenous, Q12H, Stephan Abreu DO, 500 mg at  "02/13/24 0237    ondansetron (ZOFRAN ODT) ODT tab 4 mg, 4 mg, Oral, Q6H PRN **OR** ondansetron (ZOFRAN) injection 4 mg, 4 mg, Intravenous, Q6H PRN, Stephan Abreu,     ondansetron (ZOFRAN) injection 4 mg, 4 mg, Intravenous, Q30 Min PRN, Janusz Chatterjee MD, 4 mg at 02/12/24 0782    pantoprazole (PROTONIX) EC tablet 40 mg, 40 mg, Oral, QAM AC, Stephan Abreu,     prochlorperazine (COMPAZINE) injection 10 mg, 10 mg, Intravenous, Q6H PRN **OR** prochlorperazine (COMPAZINE) tablet 10 mg, 10 mg, Oral, Q6H PRN **OR** prochlorperazine (COMPAZINE) suppository 25 mg, 25 mg, Rectal, Q12H PRN, Stephan Abreu DO    senna-docusate (SENOKOT-S/PERICOLACE) 8.6-50 MG per tablet 1 tablet, 1 tablet, Oral, BID PRN **OR** senna-docusate (SENOKOT-S/PERICOLACE) 8.6-50 MG per tablet 2 tablet, 2 tablet, Oral, BID PRN, Stephan Abreu,     sodium chloride 0.9 % infusion, , Intravenous, Continuous, Stephan Abreu, , Last Rate: 75 mL/hr at 02/13/24 0344, New Bag at 02/13/24 0344    Current Outpatient Medications:     omeprazole (PRILOSEC) 40 MG DR capsule, Take 1 capsule (40 mg) by mouth daily, Disp: 30 capsule, Rfl: 0    sucralfate (CARAFATE) 1 GM tablet, Take 1 tablet (1 g) by mouth 4 times daily, Disp: 30 tablet, Rfl: 0    ALLERGIES:  No Known Allergies    FAMILY HISTORY:  History reviewed. No pertinent family history.    SOCIAL HISTORY:   Social History     Socioeconomic History    Marital status:        PHYSICAL EXAM:    Vitals: BP 99/59 (BP Location: Right arm, Patient Position: Supine, Cuff Size: Adult Small)   Pulse 79   Temp 98.4  F (36.9  C) (Oral)   Resp 20   Ht 1.626 m (5' 4\")   Wt 57.2 kg (126 lb)   LMP 02/05/2024   SpO2 97%   BMI 21.63 kg/m     Physical Exam  Vitals and nursing note reviewed.   Constitutional:       General: She is not in acute distress.     Appearance: She is well-developed and normal weight. She is ill-appearing and diaphoretic. She is not toxic-appearing.   HENT:      Head: " Normocephalic.   Eyes:      General: No scleral icterus.  Cardiovascular:      Rate and Rhythm: Normal rate and regular rhythm.      Heart sounds: Normal heart sounds.   Pulmonary:      Effort: Pulmonary effort is normal. No respiratory distress.      Breath sounds: Normal breath sounds.   Abdominal:      General: Abdomen is flat.      Palpations: Abdomen is soft.      Tenderness: There is abdominal tenderness in the right lower quadrant and suprapubic area. There is guarding. There is no right CVA tenderness, left CVA tenderness or rebound.      Hernia: No hernia is present.   Skin:     General: Skin is warm.      Capillary Refill: Capillary refill takes less than 2 seconds.      Coloration: Skin is not cyanotic.   Neurological:      General: No focal deficit present.      Mental Status: She is alert.   Psychiatric:         Behavior: Behavior normal.           LAB:  All pertinent labs reviewed and interpreted.  Labs Ordered and Resulted from Time of ED Arrival to Time of ED Departure   BASIC METABOLIC PANEL - Abnormal       Result Value    Sodium 135      Potassium 3.7      Chloride 104      Carbon Dioxide (CO2) 21 (*)     Anion Gap 10      Urea Nitrogen 6.6      Creatinine 0.58      GFR Estimate >90      Calcium 9.0      Glucose 126 (*)    ROUTINE UA WITH MICROSCOPIC REFLEX TO CULTURE - Abnormal    Color Urine Light Yellow      Appearance Urine Clear      Glucose Urine Negative      Bilirubin Urine Negative      Ketones Urine Trace (*)     Specific Gravity Urine <1.005      Blood Urine 0.06 mg/dL (*)     pH Urine 5.5      Protein Albumin Urine Negative      Urobilinogen Urine <2.0      Nitrite Urine Negative      Leukocyte Esterase Urine 25 Mari/uL (*)     Mucus Urine Present (*)     RBC Urine 5 (*)     WBC Urine 3      Squamous Epithelials Urine 6 (*)    CBC WITH PLATELETS AND DIFFERENTIAL - Abnormal    WBC Count 20.8 (*)     RBC Count 4.22      Hemoglobin 13.2      Hematocrit 38.3      MCV 91      MCH 31.3       MCHC 34.5      RDW 13.2      Platelet Count 255      % Neutrophils 86      % Lymphocytes 6      % Monocytes 7      % Eosinophils 0      % Basophils 0      % Immature Granulocytes 1      NRBCs per 100 WBC 0      Absolute Neutrophils 17.9 (*)     Absolute Lymphocytes 1.2      Absolute Monocytes 1.4 (*)     Absolute Eosinophils 0.0      Absolute Basophils 0.1      Absolute Immature Granulocytes 0.2      Absolute NRBCs 0.0     HEPATIC FUNCTION PANEL - Normal    Protein Total 6.6      Albumin 3.9      Bilirubin Total 0.7      Alkaline Phosphatase 60      AST 21      ALT 18      Bilirubin Direct <0.20     LIPASE - Normal    Lipase 18     HCG QUALITATIVE PREGNANCY - Normal    hCG Serum Qualitative Negative     CBC WITH PLATELETS   URINE CULTURE   CHLAMYDIA TRACHOMATIS PCR   NEISSERIA GONORRHOEAE PCR       RADIOLOGY:  US Pelvis Cmplt w Transvag & Doppler LmtPel Duplex Limited   Final Result   IMPRESSION:     1.  Pyosalpinx versus hematosalpinx on the right. Hydrosalpinx on the left. Clinical correlation for signs of acute infection recommended.   2.  No drainable abscess.   3.  Arcuate versus septate configuration of the endometrium.               CT Abdomen Pelvis w Contrast   Final Result   IMPRESSION:    1. Bilateral dilated fallopian tubes, worrisome for tubo-ovarian abscess, recommend further evaluation with pelvic ultrasound.   2. Moderate amount of stool throughout the colon, nonspecific, can be seen with constipation.   3. 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            PROCEDURES:   Procedures       I, Yesenia Hooper, am serving as a scribe to document services personally performed by Dr. Janusz Chatterjee  based on my observation and the provider's statements to me. I, Janusz Chatterjee MD attest that Yesenia Hooper is acting in a scribe capacity, has observed my performance of the services and has documented them in accordance with my direction.      Janusz Chatterjee M.D.  Emergency  [facility] Emergency Department       Janusz Chatterjee MD  02/13/24 0516

## 2024-02-14 LAB
ANION GAP SERPL CALCULATED.3IONS-SCNC: 7 MMOL/L (ref 7–15)
BACTERIA UR CULT: NORMAL
BUN SERPL-MCNC: 8.2 MG/DL (ref 6–20)
CALCIUM SERPL-MCNC: 8.8 MG/DL (ref 8.6–10)
CHLORIDE SERPL-SCNC: 109 MMOL/L (ref 98–107)
CREAT SERPL-MCNC: 0.56 MG/DL (ref 0.51–0.95)
DEPRECATED HCO3 PLAS-SCNC: 24 MMOL/L (ref 22–29)
EGFRCR SERPLBLD CKD-EPI 2021: >90 ML/MIN/1.73M2
ERYTHROCYTE [DISTWIDTH] IN BLOOD BY AUTOMATED COUNT: 13.3 % (ref 10–15)
GLUCOSE SERPL-MCNC: 92 MG/DL (ref 70–99)
HCT VFR BLD AUTO: 31.6 % (ref 35–47)
HGB BLD-MCNC: 10.6 G/DL (ref 11.7–15.7)
MCH RBC QN AUTO: 31.2 PG (ref 26.5–33)
MCHC RBC AUTO-ENTMCNC: 33.5 G/DL (ref 31.5–36.5)
MCV RBC AUTO: 93 FL (ref 78–100)
PLATELET # BLD AUTO: 189 10E3/UL (ref 150–450)
POTASSIUM SERPL-SCNC: 3.5 MMOL/L (ref 3.4–5.3)
RBC # BLD AUTO: 3.4 10E6/UL (ref 3.8–5.2)
SODIUM SERPL-SCNC: 140 MMOL/L (ref 135–145)
WBC # BLD AUTO: 14.6 10E3/UL (ref 4–11)

## 2024-02-14 PROCEDURE — 82374 ASSAY BLOOD CARBON DIOXIDE: CPT | Performed by: STUDENT IN AN ORGANIZED HEALTH CARE EDUCATION/TRAINING PROGRAM

## 2024-02-14 PROCEDURE — 250N000011 HC RX IP 250 OP 636: Performed by: HOSPITALIST

## 2024-02-14 PROCEDURE — 99232 SBSQ HOSP IP/OBS MODERATE 35: CPT | Performed by: STUDENT IN AN ORGANIZED HEALTH CARE EDUCATION/TRAINING PROGRAM

## 2024-02-14 PROCEDURE — 85027 COMPLETE CBC AUTOMATED: CPT | Performed by: STUDENT IN AN ORGANIZED HEALTH CARE EDUCATION/TRAINING PROGRAM

## 2024-02-14 PROCEDURE — 82565 ASSAY OF CREATININE: CPT | Performed by: STUDENT IN AN ORGANIZED HEALTH CARE EDUCATION/TRAINING PROGRAM

## 2024-02-14 PROCEDURE — 86780 TREPONEMA PALLIDUM: CPT | Performed by: STUDENT IN AN ORGANIZED HEALTH CARE EDUCATION/TRAINING PROGRAM

## 2024-02-14 PROCEDURE — 250N000013 HC RX MED GY IP 250 OP 250 PS 637: Performed by: STUDENT IN AN ORGANIZED HEALTH CARE EDUCATION/TRAINING PROGRAM

## 2024-02-14 PROCEDURE — 250N000013 HC RX MED GY IP 250 OP 250 PS 637: Performed by: OBSTETRICS & GYNECOLOGY

## 2024-02-14 PROCEDURE — 120N000001 HC R&B MED SURG/OB

## 2024-02-14 PROCEDURE — 36415 COLL VENOUS BLD VENIPUNCTURE: CPT | Performed by: STUDENT IN AN ORGANIZED HEALTH CARE EDUCATION/TRAINING PROGRAM

## 2024-02-14 PROCEDURE — 258N000003 HC RX IP 258 OP 636: Performed by: STUDENT IN AN ORGANIZED HEALTH CARE EDUCATION/TRAINING PROGRAM

## 2024-02-14 PROCEDURE — 250N000013 HC RX MED GY IP 250 OP 250 PS 637: Performed by: HOSPITALIST

## 2024-02-14 RX ORDER — MAGNESIUM CARB/ALUMINUM HYDROX 105-160MG
74 TABLET,CHEWABLE ORAL ONCE
Status: COMPLETED | OUTPATIENT
Start: 2024-02-14 | End: 2024-02-14

## 2024-02-14 RX ORDER — ENOXAPARIN SODIUM 100 MG/ML
40 INJECTION SUBCUTANEOUS EVERY 24 HOURS
Status: DISCONTINUED | OUTPATIENT
Start: 2024-02-14 | End: 2024-02-15 | Stop reason: HOSPADM

## 2024-02-14 RX ADMIN — IBUPROFEN 400 MG: 400 TABLET, FILM COATED ORAL at 12:53

## 2024-02-14 RX ADMIN — OXYCODONE HYDROCHLORIDE 5 MG: 5 TABLET ORAL at 08:25

## 2024-02-14 RX ADMIN — ACETAMINOPHEN 650 MG: 325 TABLET ORAL at 20:56

## 2024-02-14 RX ADMIN — POLYETHYLENE GLYCOL 3350 17 G: 17 POWDER, FOR SOLUTION ORAL at 08:32

## 2024-02-14 RX ADMIN — OXYCODONE HYDROCHLORIDE 5 MG: 5 TABLET ORAL at 14:30

## 2024-02-14 RX ADMIN — SIMETHICONE 80 MG: 80 TABLET, CHEWABLE ORAL at 17:05

## 2024-02-14 RX ADMIN — SIMETHICONE 80 MG: 80 TABLET, CHEWABLE ORAL at 06:32

## 2024-02-14 RX ADMIN — DEXTROAMPHETAMINE SACCHARATE, AMPHETAMINE ASPARTATE, DEXTROAMPHETAMINE SULFATE AND AMPHETAMINE SULFATE 10 MG: 2.5; 2.5; 2.5; 2.5 TABLET ORAL at 08:24

## 2024-02-14 RX ADMIN — BE HEALTH MAGNESIUM CITRATE ORAL SOLUTION - CHERRY 74 ML: 1.75 LIQUID ORAL at 12:54

## 2024-02-14 RX ADMIN — ACETAMINOPHEN 650 MG: 325 TABLET ORAL at 11:50

## 2024-02-14 RX ADMIN — DOCUSATE SODIUM 100 MG: 100 CAPSULE, LIQUID FILLED ORAL at 19:49

## 2024-02-14 RX ADMIN — DOXYCYCLINE HYCLATE 100 MG: 100 CAPSULE ORAL at 19:49

## 2024-02-14 RX ADMIN — CEFTRIAXONE 1 G: 1 INJECTION, POWDER, FOR SOLUTION INTRAMUSCULAR; INTRAVENOUS at 00:27

## 2024-02-14 RX ADMIN — OXYCODONE HYDROCHLORIDE 5 MG: 5 TABLET ORAL at 00:32

## 2024-02-14 RX ADMIN — DOCUSATE SODIUM 100 MG: 100 CAPSULE, LIQUID FILLED ORAL at 08:25

## 2024-02-14 RX ADMIN — PANTOPRAZOLE SODIUM 40 MG: 40 TABLET, DELAYED RELEASE ORAL at 06:30

## 2024-02-14 RX ADMIN — ACETAMINOPHEN 650 MG: 325 TABLET ORAL at 16:13

## 2024-02-14 RX ADMIN — METRONIDAZOLE 500 MG: 500 INJECTION, SOLUTION INTRAVENOUS at 13:03

## 2024-02-14 RX ADMIN — IBUPROFEN 400 MG: 400 TABLET, FILM COATED ORAL at 06:30

## 2024-02-14 RX ADMIN — IBUPROFEN 400 MG: 400 TABLET, FILM COATED ORAL at 00:32

## 2024-02-14 RX ADMIN — DEXTROAMPHETAMINE SACCHARATE, AMPHETAMINE ASPARTATE MONOHYDRATE, DEXTROAMPHETAMINE SULFATE, AMPHETAMINE SULFATE 10 MG: 2.5; 2.5; 2.5; 2.5 CAPSULE, EXTENDED RELEASE ORAL at 08:24

## 2024-02-14 RX ADMIN — DEXTROAMPHETAMINE SACCHARATE, AMPHETAMINE ASPARTATE, DEXTROAMPHETAMINE SULFATE AND AMPHETAMINE SULFATE 5 MG: 1.25; 1.25; 1.25; 1.25 TABLET ORAL at 13:03

## 2024-02-14 RX ADMIN — METRONIDAZOLE 500 MG: 500 INJECTION, SOLUTION INTRAVENOUS at 01:16

## 2024-02-14 RX ADMIN — MAGNESIUM HYDROXIDE 30 ML: 400 SUSPENSION ORAL at 22:24

## 2024-02-14 RX ADMIN — OXYCODONE HYDROCHLORIDE 5 MG: 5 TABLET ORAL at 20:56

## 2024-02-14 RX ADMIN — DOXYCYCLINE HYCLATE 100 MG: 100 CAPSULE ORAL at 08:25

## 2024-02-14 RX ADMIN — SODIUM CHLORIDE, POTASSIUM CHLORIDE, SODIUM LACTATE AND CALCIUM CHLORIDE: 600; 310; 30; 20 INJECTION, SOLUTION INTRAVENOUS at 08:50

## 2024-02-14 RX ADMIN — ESCITALOPRAM OXALATE 10 MG: 10 TABLET ORAL at 08:24

## 2024-02-14 RX ADMIN — IBUPROFEN 400 MG: 400 TABLET, FILM COATED ORAL at 19:48

## 2024-02-14 ASSESSMENT — ACTIVITIES OF DAILY LIVING (ADL)
ADLS_ACUITY_SCORE: 18
ADLS_ACUITY_SCORE: 19
ADLS_ACUITY_SCORE: 18

## 2024-02-14 NOTE — PLAN OF CARE
Problem: Pain Acute  Goal: Optimal Pain Control and Function  Outcome: Progressing  Intervention: Prevent or Manage Pain  Recent Flowsheet Documentation  Taken 2/14/2024 3278 by Loretta Ocampo RN  Medication Review/Management: medications reviewed   Goal Outcome Evaluation:    Pt independent in room. Rebound tenderness in all quadrants. PRN oxy given. Magnesium citrate administered for constipation; no BM.    Ambulates independently; encouraged ambulation in hallways.

## 2024-02-14 NOTE — PLAN OF CARE
Problem: Pain Acute  Goal: Optimal Pain Control and Function  2/13/2024 1837 by Loretta Ocampo, RN  Outcome: Progressing  2/13/2024 1836 by Loretta Ocampo, RN  Outcome: Progressing   Goal Outcome Evaluation:    Pt c/o pain in RLQ. PRN oxy given. IVF LR @ 150mL/hr. Independent in room. VSS.

## 2024-02-14 NOTE — PLAN OF CARE
Problem: Adult Inpatient Plan of Care  Goal: Optimal Comfort and Wellbeing  Outcome: Progressing  Intervention: Monitor Pain and Promote Comfort  Recent Flowsheet Documentation  Taken 2/13/2024 1940 by Yesy Lowery RN  Pain Management Interventions:   medication (see MAR)   emotional support   repositioned   rest     Problem: Pain Acute  Goal: Optimal Pain Control and Function  Outcome: Progressing  Intervention: Develop Pain Management Plan  Recent Flowsheet Documentation  Taken 2/13/2024 1940 by Yesy Lowery RN  Pain Management Interventions:   medication (see MAR)   emotional support   repositioned   rest  Intervention: Prevent or Manage Pain  Recent Flowsheet Documentation  Taken 2/13/2024 1940 by Yesy Lowery RN  Sleep/Rest Enhancement:   awakenings minimized   noise level reduced   regular sleep/rest pattern promoted  Bowel Elimination Promotion:   adequate fluid intake promoted   ambulation promoted  Medication Review/Management: medications reviewed  Intervention: Optimize Psychosocial Wellbeing  Recent Flowsheet Documentation  Taken 2/13/2024 1940 by Yesy Lowery RN  Supportive Measures: active listening utilized   Goal Outcome Evaluation:  Patient A&O x4, able to make needs known. VSS on RA. Pain rated 6-9/10 in abdomen. Pain controlled with scheduled Advil and PRN Tylenol, Dilaudid, and Simethicone. Left PIV infusing LR at 150mL/hr. Lung sounds clear. Complains of abdominal discomfort. Abdomen tender upon palpation with guarding. Bowel sounds audible and normoactive. Is passing gas. Voiding spontaneously. Regular diet. Independent with activity. Discharge pending.

## 2024-02-14 NOTE — PLAN OF CARE
Problem: Pain Acute  Goal: Optimal Pain Control and Function  Outcome: Progressing  Intervention: Develop Pain Management Plan  Recent Flowsheet Documentation  Taken 2/14/2024 0032 by Mayra Stockton RN  Pain Management Interventions: medication (see MAR)  Intervention: Prevent or Manage Pain  Recent Flowsheet Documentation  Taken 2/14/2024 0032 by Mayra Stockton RN  Medication Review/Management: medications reviewed  Intervention: Optimize Psychosocial Wellbeing  Recent Flowsheet Documentation  Taken 2/14/2024 0032 by Mayra Stockton RN  Supportive Measures: active listening utilized   Goal Outcome Evaluation:       Patient reports her pain has improved over the night. Did request PRN Oxycodone at the start of shift but then has been doing well with scheduled Ibuprofen for pain control. IVF running. Received IV antibiotics on shift. Independent in room, calls appropriately.

## 2024-02-14 NOTE — PROGRESS NOTES
[facility] MEDICINE  PROGRESS NOTE       Securely message me with Amanda (more info)    Code Status: Full Code       Identification/Summary:   Magdalene Mensah is a 43 year old female who presented with complaints of right lower quadrant abdominal pain. Found to be in sepsis and has pyosalpinx. No spot for draining.  Initial treatment included doxycycline, ceftriaxone, Toradol, IV fluids, Dilaudid. Metronidazole added by Dr. Abreu. GYN agrrees with management. Gonorrhea and chlamydia is negative. She does have ER visit for RUQ pain a few months ago. She is doing much better on 2/14.    Assessment and Plan:  Sepsis  Tubo-ovarian abscess   - continue IV doxycycline, ceftriaxone and metronidazole  - continue narcotics prn for pain, IV dilaudid prn  - check syphilis per patient request, HIV and hep B were negative before with 2 known sex partners, Gonorrhea and chlamydia is negative.     Constipation  - multiple bowel regimen    Anticoagulation   Lovenox    Therapy: none  Bautista:Not present  Lines: None       Current Diet  Orders Placed This Encounter      Regular Diet Adult          Barriers to Discharge: IV abx    Disposition: inpatient    Clinically Significant Risk Factors                                    Interval History/Subjective:  She feels much better but required 2 IV abx dose over night. No nausea. Feels bloated.      Last 24H PRN:      acetaminophen (TYLENOL) tablet 650 mg, 650 mg at 02/14/24 1150 **OR** acetaminophen (TYLENOL) Suppository 650 mg     HYDROmorphone (DILAUDID) injection 0.2 mg, 0.2 mg at 02/13/24 1940     oxyCODONE (ROXICODONE) tablet 5 mg, 5 mg at 02/14/24 0825     simethicone (MYLICON) chewable tablet 80 mg, 80 mg at 02/14/24 0632    Physical Exam/Objective:  Temp:  [97.7  F (36.5  C)-98.5  F (36.9  C)] 97.7  F (36.5  C)  Pulse:  [66-85] 66  Resp:  [16-20] 16  BP: (95-97)/(52-55) 97/55  SpO2:  [96 %-97 %] 97 %  Wt Readings from Last 4 Encounters:   02/14/24  63.3 kg (139 lb 9.6 oz)   06/16/23 56.7 kg (125 lb)     Body mass index is 23.96 kg/m .    General Appearance: Alert and wake, not in distress  GI: soft, tender on lower abdomen, hypoactive bowel sound  Neurology: oriented x 3  Psych: cooperative and calm, normal affect    Medications:   Personally Reviewed.  Medications       amphetamine-dextroamphetamine  10 mg Oral QAM     amphetamine-dextroamphetamine  10 mg Oral QAM     amphetamine-dextroamphetamine  5-10 mg Oral Daily with lunch     cefTRIAXone  1 g Intravenous Q24H     docusate sodium  100 mg Oral BID     doxycycline hyclate  100 mg Oral Q12H [facility] (08/20)     escitalopram  10 mg Oral Daily     ibuprofen  400 mg Oral Q6H     metroNIDAZOLE  500 mg Intravenous Q12H     pantoprazole  40 mg Oral QAM AC     polyethylene glycol  17 g Oral Daily       Data reviewed today: I personally reviewed all new medications, labs, imaging/diagnostics reports over the past 24 hours. Pertinent findings include:    Imaging:   No results found for this or any previous visit (from the past 24 hour(s)).    Labs:  US Pelvis Cmplt w Transvag & Doppler LmtPel Duplex Limited   Final Result   IMPRESSION:     1.  Pyosalpinx versus hematosalpinx on the right. Hydrosalpinx on the left. Clinical correlation for signs of acute infection recommended.   2.  No drainable abscess.   3.  Arcuate versus septate configuration of the endometrium.               CT Abdomen Pelvis w Contrast   Final Result   IMPRESSION:    1. Bilateral dilated fallopian tubes, worrisome for tubo-ovarian abscess, recommend further evaluation with pelvic ultrasound.   2. Moderate amount of stool throughout the colon, nonspecific, can be seen with constipation.   3. 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        Recent Results (from the past 24 hour(s))   CBC with platelets    Collection Time: 02/14/24  7:12 AM   Result Value Ref Range    WBC Count 14.6 (H) 4.0 - 11.0 10e3/uL    RBC Count 3.40  (L) 3.80 - 5.20 10e6/uL    Hemoglobin 10.6 (L) 11.7 - 15.7 g/dL    Hematocrit 31.6 (L) 35.0 - 47.0 %    MCV 93 78 - 100 fL    MCH 31.2 26.5 - 33.0 pg    MCHC 33.5 31.5 - 36.5 g/dL    RDW 13.3 10.0 - 15.0 %    Platelet Count 189 150 - 450 10e3/uL   Basic metabolic panel    Collection Time: 02/14/24  7:12 AM   Result Value Ref Range    Sodium 140 135 - 145 mmol/L    Potassium 3.5 3.4 - 5.3 mmol/L    Chloride 109 (H) 98 - 107 mmol/L    Carbon Dioxide (CO2) 24 22 - 29 mmol/L    Anion Gap 7 7 - 15 mmol/L    Urea Nitrogen 8.2 6.0 - 20.0 mg/dL    Creatinine 0.56 0.51 - 0.95 mg/dL    GFR Estimate >90 >60 mL/min/1.73m2    Calcium 8.8 8.6 - 10.0 mg/dL    Glucose 92 70 - 99 mg/dL       Pending Labs:  Unresulted Labs Ordered in the Past 30 Days of this Admission       Date and Time Order Name Status Description    2/14/2024 10:38 AM Treponema Abs w Reflex to RPR and Titer In process               HUMBLE GROSSMAN MD  [facility] Medicine  [facility]  Phone: #[phone]    Securely message me with Amanda (more info)

## 2024-02-15 VITALS
BODY MASS INDEX: 23.83 KG/M2 | TEMPERATURE: 98.1 F | DIASTOLIC BLOOD PRESSURE: 63 MMHG | WEIGHT: 139.6 LBS | RESPIRATION RATE: 16 BRPM | OXYGEN SATURATION: 97 % | HEART RATE: 89 BPM | SYSTOLIC BLOOD PRESSURE: 117 MMHG | HEIGHT: 64 IN

## 2024-02-15 LAB — T PALLIDUM AB SER QL: NONREACTIVE

## 2024-02-15 PROCEDURE — 250N000013 HC RX MED GY IP 250 OP 250 PS 637: Performed by: STUDENT IN AN ORGANIZED HEALTH CARE EDUCATION/TRAINING PROGRAM

## 2024-02-15 PROCEDURE — 99239 HOSP IP/OBS DSCHRG MGMT >30: CPT | Performed by: STUDENT IN AN ORGANIZED HEALTH CARE EDUCATION/TRAINING PROGRAM

## 2024-02-15 PROCEDURE — 250N000011 HC RX IP 250 OP 636: Performed by: HOSPITALIST

## 2024-02-15 PROCEDURE — 250N000013 HC RX MED GY IP 250 OP 250 PS 637: Performed by: HOSPITALIST

## 2024-02-15 RX ORDER — IBUPROFEN 400 MG/1
400 TABLET, FILM COATED ORAL EVERY 6 HOURS
Qty: 8 TABLET | Refills: 0 | Status: SHIPPED | OUTPATIENT
Start: 2024-02-15

## 2024-02-15 RX ORDER — NALOXONE HYDROCHLORIDE 0.4 MG/ML
0.4 INJECTION, SOLUTION INTRAMUSCULAR; INTRAVENOUS; SUBCUTANEOUS
Status: DISCONTINUED | OUTPATIENT
Start: 2024-02-15 | End: 2024-02-15 | Stop reason: HOSPADM

## 2024-02-15 RX ORDER — POLYETHYLENE GLYCOL 3350 17 G/17G
17 POWDER, FOR SOLUTION ORAL 2 TIMES DAILY
COMMUNITY
Start: 2024-02-16

## 2024-02-15 RX ORDER — DOCUSATE SODIUM 100 MG/1
200 CAPSULE, LIQUID FILLED ORAL 2 TIMES DAILY
COMMUNITY
Start: 2024-02-15

## 2024-02-15 RX ORDER — NALOXONE HYDROCHLORIDE 0.4 MG/ML
0.2 INJECTION, SOLUTION INTRAMUSCULAR; INTRAVENOUS; SUBCUTANEOUS
Status: DISCONTINUED | OUTPATIENT
Start: 2024-02-15 | End: 2024-02-15 | Stop reason: HOSPADM

## 2024-02-15 RX ORDER — METRONIDAZOLE 500 MG/1
500 TABLET ORAL 2 TIMES DAILY
Qty: 28 TABLET | Refills: 0 | OUTPATIENT
Start: 2024-02-15 | End: 2024-08-08

## 2024-02-15 RX ORDER — METRONIDAZOLE 500 MG/1
500 TABLET ORAL 2 TIMES DAILY
Qty: 23 TABLET | Refills: 0 | Status: SHIPPED | OUTPATIENT
Start: 2024-02-15

## 2024-02-15 RX ORDER — DOXYCYCLINE HYCLATE 100 MG
100 TABLET ORAL 2 TIMES DAILY
Qty: 28 TABLET | Refills: 0 | OUTPATIENT
Start: 2024-02-15 | End: 2024-08-08

## 2024-02-15 RX ORDER — MAGNESIUM CARB/ALUMINUM HYDROX 105-160MG
296 TABLET,CHEWABLE ORAL
COMMUNITY
Start: 2024-02-15

## 2024-02-15 RX ORDER — OXYCODONE HYDROCHLORIDE 5 MG/1
5 TABLET ORAL EVERY 6 HOURS PRN
Qty: 20 TABLET | Refills: 0 | Status: SHIPPED | OUTPATIENT
Start: 2024-02-15

## 2024-02-15 RX ORDER — PANTOPRAZOLE SODIUM 40 MG/1
40 TABLET, DELAYED RELEASE ORAL
Qty: 4 TABLET | Refills: 0 | Status: SHIPPED | OUTPATIENT
Start: 2024-02-16

## 2024-02-15 RX ORDER — DOXYCYCLINE 100 MG/1
100 CAPSULE ORAL EVERY 12 HOURS
Qty: 23 CAPSULE | Refills: 0 | Status: SHIPPED | OUTPATIENT
Start: 2024-02-15

## 2024-02-15 RX ADMIN — IBUPROFEN 400 MG: 400 TABLET, FILM COATED ORAL at 02:47

## 2024-02-15 RX ADMIN — OXYCODONE HYDROCHLORIDE 5 MG: 5 TABLET ORAL at 13:13

## 2024-02-15 RX ADMIN — ACETAMINOPHEN 650 MG: 325 TABLET ORAL at 05:12

## 2024-02-15 RX ADMIN — ESCITALOPRAM OXALATE 10 MG: 10 TABLET ORAL at 08:41

## 2024-02-15 RX ADMIN — DEXTROAMPHETAMINE SACCHARATE, AMPHETAMINE ASPARTATE, DEXTROAMPHETAMINE SULFATE AND AMPHETAMINE SULFATE 10 MG: 1.25; 1.25; 1.25; 1.25 TABLET ORAL at 13:13

## 2024-02-15 RX ADMIN — DEXTROAMPHETAMINE SACCHARATE, AMPHETAMINE ASPARTATE, DEXTROAMPHETAMINE SULFATE AND AMPHETAMINE SULFATE 10 MG: 2.5; 2.5; 2.5; 2.5 TABLET ORAL at 08:41

## 2024-02-15 RX ADMIN — OXYCODONE HYDROCHLORIDE 5 MG: 5 TABLET ORAL at 02:47

## 2024-02-15 RX ADMIN — METRONIDAZOLE 500 MG: 500 INJECTION, SOLUTION INTRAVENOUS at 02:48

## 2024-02-15 RX ADMIN — CEFTRIAXONE 1 G: 1 INJECTION, POWDER, FOR SOLUTION INTRAMUSCULAR; INTRAVENOUS at 02:53

## 2024-02-15 RX ADMIN — ACETAMINOPHEN 650 MG: 325 TABLET ORAL at 09:40

## 2024-02-15 RX ADMIN — DEXTROAMPHETAMINE SACCHARATE, AMPHETAMINE ASPARTATE MONOHYDRATE, DEXTROAMPHETAMINE SULFATE, AMPHETAMINE SULFATE 10 MG: 2.5; 2.5; 2.5; 2.5 CAPSULE, EXTENDED RELEASE ORAL at 09:33

## 2024-02-15 RX ADMIN — PANTOPRAZOLE SODIUM 40 MG: 40 TABLET, DELAYED RELEASE ORAL at 08:04

## 2024-02-15 RX ADMIN — POLYETHYLENE GLYCOL 3350 17 G: 17 POWDER, FOR SOLUTION ORAL at 08:41

## 2024-02-15 RX ADMIN — SIMETHICONE 80 MG: 80 TABLET, CHEWABLE ORAL at 13:13

## 2024-02-15 RX ADMIN — DOXYCYCLINE HYCLATE 100 MG: 100 CAPSULE ORAL at 08:41

## 2024-02-15 RX ADMIN — DOCUSATE SODIUM 100 MG: 100 CAPSULE, LIQUID FILLED ORAL at 08:41

## 2024-02-15 RX ADMIN — IBUPROFEN 400 MG: 400 TABLET, FILM COATED ORAL at 08:41

## 2024-02-15 ASSESSMENT — ACTIVITIES OF DAILY LIVING (ADL)
ADLS_ACUITY_SCORE: 18

## 2024-02-15 NOTE — DISCHARGE SUMMARY
[facility]  Hospitalist Discharge Summary      Date of Admission:  2/12/2024  Date of Discharge:  2/15/2024  2:24 PM  Discharging Provider: HUMBLE GROSSMAN MD  Discharge Service: Hospitalist Service    Discharge Diagnoses   Sepsis  Pelvic inflammatory disease with tubo-ovarian abscess   Constipation     Clinically Significant Risk Factors          Follow-ups Needed After Discharge   Follow-up Appointments     Follow-up and recommended labs and tests       Follow up with GYN doctor in 1-2 weeks. Referral is sent.            Unresulted Labs Ordered in the Past 30 Days of this Admission       No orders found from 1/13/2024 to 2/13/2024.            Discharge Disposition   Discharged to home  Condition at discharge: Stable    Hospital Course   Magdalene Mensah is a 43 year old female who presented with complaints of right lower quadrant abdominal pain. Found to be in sepsis and has pyosalpinx. No spot for draining.  Initial treatment included doxycycline, ceftriaxone, Toradol, IV fluids, Dilaudid. Metronidazole added by Dr. Abreu. GYN agrrees with management. Gonorrhea and chlamydia is negative. She does have ER visit for RUQ pain a few months ago. She is doing much better and off IV narcotics on 2/15. GYN is ok for discharge with oral abx for a total of 14 days and follow up with them in 1-2 weeks. We went over plans for bowel regimen extensively. She has all supplies at home.    Consultations This Hospital Stay   OB GYN IP CONSULT    Code Status   Prior    Time Spent on this Encounter   I, HUMBLE GROSSMAN MD, personally saw the patient today and spent greater than 30 minutes discharging this patient.       HUMBLE GROSSMAN MD  [address] [address]  Phone: [phone]  Fax: [phone]  ______________________________________________________________________    Physical Exam   Vital Signs: Temp: 98.1  F (36.7  C) Temp src: Oral BP:  117/63 Pulse: 89   Resp: 16 SpO2: 97 % O2 Device: None (Room air)    Weight: 139 lbs 9.6 oz  General Appearance: Alert and wake, not in distress  Neurology: oriented x 3  Psych: cooperative and calm, normal affect  GI: soft, tender in the lower abdomen, hypoactive bowel sound           Primary Care Physician   Ioana Story Dayo    Discharge Orders      Ob/Gyn  Referral      Primary Care - Care Coordination Referral      Reason for your hospital stay    Pelvic inflammatory disease     Activity    Your activity upon discharge: activity as tolerated     Follow-up and recommended labs and tests     Follow up with GYN doctor in 1-2 weeks. Referral is sent.     Diet    Follow this diet upon discharge: Orders Placed This Encounter      Regular Diet Adult, high soluble fiber       Significant Results and Procedures   Most Recent 3 CBC's:  Recent Labs   Lab Test 02/14/24  0712 02/13/24  0603 02/12/24  2150   WBC 14.6* 17.5* 20.8*   HGB 10.6* 11.3* 13.2   MCV 93 93 91    215 255     Most Recent 3 BMP's:  Recent Labs   Lab Test 02/14/24  0712 02/12/24 2150 06/16/23  1906    135 140   POTASSIUM 3.5 3.7 3.6   CHLORIDE 109* 104 104   CO2 24 21* 28   BUN 8.2 6.6 10   CR 0.56 0.58 0.75   ANIONGAP 7 10 8   LULY 8.8 9.0 9.5   GLC 92 126* 83     Most Recent 2 LFT's:  Recent Labs   Lab Test 02/12/24  2150 06/16/23  1906   AST 21 20   ALT 18 17   ALKPHOS 60 49   BILITOTAL 0.7 0.6   ,   Results for orders placed or performed during the hospital encounter of 02/12/24   CT Abdomen Pelvis w Contrast    Narrative    EXAM: CT ABDOMEN PELVIS W CONTRAST  LOCATION: [facility]  DATE: 2/12/2024    INDICATION: RLQ pain.  COMPARISON: Abdominal ultrasound on 06/06/2023.  TECHNIQUE: CT scan of the abdomen and pelvis was performed after the injection of 90 mL Isovue-370 intravenously. Multiplanar reformats were obtained. Dose reduction techniques were used.    FINDINGS:   LOWER CHEST: Lung bases are  clear.    ABDOMEN/PELVIS:    HEPATOBILIARY: Multiple hypodense foci in the liver, some can be characterized as simple cysts, while multiple others are subcentimeter and too small to characterize. The gallbladder is unremarkable.    PANCREAS: No main pancreatic ductal dilatation or definite solid pancreatic mass.    SPLEEN: No splenomegaly.    ADRENAL GLANDS: No adrenal nodules.    KIDNEYS/BLADDER: No radiodense kidney/ureteral stones or hydronephrosis in either kidney. Multiple subcentimeter hypodense foci in the kidneys, are too small to characterize.    BOWEL: No abnormally dilated bowel loops. Moderate amount of stool throughout the colon, nonspecific, can be seen with constipation. The appendix is not definitely visualized.    PERITONEUM: Small amount of fluid in the pelvis, could be reactive.    PELVIC ORGANS: Bilateral dilated fallopian tube, worrisome for tubo-ovarian abscess. Divergent uterine horns, that can be seen with septate or bicornuate uterus. Possible cervical nabothian cysts.    VASCULATURE: Unremarkable.    LYMPH NODES: No significant abdominopelvic lymphadenopathy.    MUSCULOSKELETAL: No suspicious osseous lesion.      Impression    IMPRESSION:   1. Bilateral dilated fallopian tubes, worrisome for tubo-ovarian abscess, recommend further evaluation with pelvic ultrasound.  2. Moderate amount of stool throughout the colon, nonspecific, can be seen with constipation.  3. 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   US Pelvis Cmplt w Transvag & Doppler LmtPel Duplex Limited    Narrative    EXAM: US PELVIS COMPLETE W TRANSVAGINAL AND DOPPLER LIMITED  LOCATION: [facility]  DATE: 2/13/2024    INDICATION: Pelvic pain  COMPARISON: CT abdomen and pelvis today.  TECHNIQUE: Transabdominal scans were performed. Endovaginal ultrasound was performed to better visualize the adnexa. Color flow with spectral Doppler and waveform analysis  performed.    FINDINGS:    UTERUS: 8.7 x 5.6 x 4.6 cm. Anteverted uterus. Post  section. No uterine mass.    ENDOMETRIUM: 8 mm. Homogeneous endometrial texture. Arcuate versus possible septate configuration. No fundal indentation visualized as would be expected with a bicornuate uterus.    RIGHT OVARY: 4.6 x 3.5 x 3.2 cm. A few small simple appearing thin-walled unilocular cysts of the right ovary consistent with follicles. Normal arterial and venous duplex flow of the right ovary demonstrated. Moderately dilated convoluted tubular   structure of the right adnexa containing echogenic material consistent with pyosalpinx versus hematosalpinx.    LEFT OVARY: 2.3 x 1.4 x 1.2 cm. Normal with color Doppler blood flow demonstrated. Arterial and venous Doppler waveforms difficult to demonstrate due to position. Mildly dilated and convoluted tubular anechoic structure of the left adnexa consistent with   hydrosalpinx.    No drainable abscess visualized. Small amount of free pelvic fluid.      Impression    IMPRESSION:    1.  Pyosalpinx versus hematosalpinx on the right. Hydrosalpinx on the left. Clinical correlation for signs of acute infection recommended.  2.  No drainable abscess.  3.  Arcuate versus septate configuration of the endometrium.             Discharge Medications   Discharge Medication List as of 2/15/2024  1:24 PM        START taking these medications    Details   docusate sodium (COLACE) 100 MG capsule Take 2 capsules (200 mg) by mouth 2 times daily, HistoricalHold for loose stool      doxycycline hyclate (VIBRAMYCIN) 100 MG capsule Take 1 capsule (100 mg) by mouth every 12 hours Take on empty stomach. No food or other medicine 2 hours before and 2 hours after doxycycline., Disp-23 capsule, R-0, E-PrescribeTake on empty stomach. No food or other medicine 2 hours before and 2 hours  after doxycycline.      ibuprofen (ADVIL/MOTRIN) 400 MG tablet Take 1 tablet (400 mg) by mouth every 6 hours, Disp-8  tablet, R-0, E-PrescribeStop taking after you finish all the provided pill. Then take tylenol for 3 days before you restart ibuprofen as needed.      metroNIDAZOLE (FLAGYL) 500 MG tablet Take 1 tablet (500 mg) by mouth 2 times daily, Disp-23 tablet, R-0, E-Prescribe      oxyCODONE (ROXICODONE) 5 MG tablet Take 1 tablet (5 mg) by mouth every 6 hours as needed for severe pain or moderate pain, Disp-20 tablet, R-0, E-Prescribe      pantoprazole (PROTONIX) 40 MG EC tablet Take 1 tablet (40 mg) by mouth every morning (before breakfast), Disp-4 tablet, R-0, E-Prescribe      polyethylene glycol (MIRALAX) 17 g packet Take 17 g by mouth 2 times daily Hold for loose stool, HistoricalHold for loose stool           CONTINUE these medications which have NOT CHANGED    Details   amphetamine-dextroamphetamine (ADDERALL XR) 10 MG 24 hr capsule Take 10 mg by mouth every morning 10mg XR  with a 10 mg regular release in the morning, Historical      !! amphetamine-dextroamphetamine (ADDERALL) 10 MG tablet Take 10 mg by mouth every morning 10mg XR  with a 10 mg regular release  in the morning, Historical      !! amphetamine-dextroamphetamine (ADDERALL) 10 MG tablet Take 5-10 mg by mouth daily (with lunch) At noon, Historical      escitalopram (LEXAPRO) 10 MG tablet Take 10 mg by mouth daily, Historical      hydrocortisone 2.5 % cream Apply topically 2 times daily as needed for rash (face)Historical      magnesium citrate 1.745 GM/30ML solution Take 296 mLs by mouth Take 148 -296ml as needed for constipation. Don't use if you have loose stool. Alternate different stool softener to avoid toxicity., HistoricalTake 148 -296ml as needed for constipation. Don't use if you have loose stool. Alternat e different stool softener to avoid toxicity.      omeprazole (PRILOSEC) 40 MG DR capsule Take 1 capsule (40 mg) by mouth daily, Disp-30 capsule, R-0, Local Print      QUEtiapine (SEROQUEL) 25 MG tablet Take 12.5-75 mg by mouth nightly as needed,  Historical      valACYclovir (VALTREX) 1000 mg tablet Take 1,000 mg by mouth 2 times daily as needed (cold sores), Historical       !! - Potential duplicate medications found. Please discuss with provider.        STOP taking these medications       sucralfate (CARAFATE) 1 GM tablet Comments:   Reason for Stopping:             Allergies   No Known Allergies

## 2024-02-15 NOTE — PROGRESS NOTES
"Called by nursing to round on Magdalene as she has not been seen by our group today.   Magdalene is admitted for a TOA on antibiotics.  She has been using IV narcotics.  Last night she got IV dilaudid and then fell fast to sleep.  She has been struggling with constipation.  Her last BM was 2-8-2024.  She did pass a \"pebble and boulder\" tonight.  We discussed trying milk of mag.  She complains of being distended.  States she has gotten 4-5 liters of IV fluid but has voided minimally.      /67 (BP Location: Right arm)   Pulse 80   Temp 97.9  F (36.6  C) (Oral)   Resp 16   Ht 1.626 m (5' 4\")   Wt 63.3 kg (139 lb 9.6 oz)   LMP 02/05/2024   SpO2 99%   BMI 23.96 kg/m    Abdomen is soft,  minimal tenderness and distended    A/P  TOA on antibiotics  Reviewed staying on the antibiotics for 1-3 days  She could go home tomorrow if she can maintain pain control with oral narcotics.  Reviewed with her nurse to try and just use oral narcotics tonight.    Reviewed she will go home on a 14 days regimen of flagyl and doxy which can cause nausea, zofran will cause more constipation so we discussed bowel regimens.  She does not typically drink very much so we talked about an 80 ounces a day goal but she is busy in C[employer] school right now.  I will write for some milk of mag for tonight.  OLVIN Washburn MD  "

## 2024-02-15 NOTE — PROGRESS NOTES
Care Management Discharge Note    Discharge Date: 02/15/2024       Discharge Disposition: Home      Handoff Referral Completed: Yes    Additional Information:  Chart reviewed, anticipate pt will discharge home.  No CM needs identified at this time.    CARLIE Hall

## 2024-02-15 NOTE — PROGRESS NOTES
"GYN progress note    ASSESSMENT: PLAN:   TOA- doing better- may discharge on 14 days flagyl BID and doxycycline BID  Follow-up with [facility] in 1-2 weeks- [phone]    SUBJECTIVE:  Patient states she is feeling much better    OBJECTIVE:  /63 (BP Location: Right arm)   Pulse 89   Temp 98.1  F (36.7  C) (Oral)   Resp 16   Ht 1.626 m (5' 4\")   Wt 63.3 kg (139 lb 9.6 oz)   LMP 02/05/2024   SpO2 97%   BMI 23.96 kg/m      Elena Marin MD    "

## 2024-02-15 NOTE — PLAN OF CARE
Problem: Pain Acute  Goal: Optimal Pain Control and Function  Intervention: Develop Pain Management Plan  Recent Flowsheet Documentation  Taken 2/15/2024 0841 by Wendy Pereira RN  Pain Management Interventions:   medication (see MAR)   rest  Intervention: Prevent or Manage Pain  Recent Flowsheet Documentation  Taken 2/15/2024 0841 by Wendy Pereira, RN  Medication Review/Management: medications reviewed   Goal Outcome Evaluation:       Patient states pain medications are effective Rates pain 2/3 out of 10 for pain after taking pain medication. States understanding of discharge instructions. Ready for discharge.

## 2024-02-15 NOTE — PLAN OF CARE
"Goal Outcome Evaluation:        Vital signs stable. Patient reports RLQ pain and feeling bloated. IV antibiotics given and ibuprofen, tylenol and oxycodone given for pain. Patient independent in room.                  Problem: Adult Inpatient Plan of Care  Goal: Plan of Care Review  Description: The Plan of Care Review/Shift note should be completed every shift.  The Outcome Evaluation is a brief statement about your assessment that the patient is improving, declining, or no change.  This information will be displayed automatically on your shift  note.  Outcome: Progressing  Goal: Patient-Specific Goal (Individualized)  Description: You can add care plan individualizations to a care plan. Examples of Individualization might be:  \"Parent requests to be called daily at 9am for status\", \"I have a hard time hearing out of my right ear\", or \"Do not touch me to wake me up as it startles  me\".  Outcome: Progressing  Goal: Absence of Hospital-Acquired Illness or Injury  Outcome: Progressing  Intervention: Identify and Manage Fall Risk  Recent Flowsheet Documentation  Taken 2/14/2024 2300 by Savana Manzo, RN  Safety Promotion/Fall Prevention: safety round/check completed  Goal: Optimal Comfort and Wellbeing  Outcome: Progressing  Goal: Readiness for Transition of Care  Outcome: Progressing     Problem: Pain Acute  Goal: Optimal Pain Control and Function  Outcome: Progressing     "

## 2024-04-11 ENCOUNTER — OFFICE VISIT (OUTPATIENT)
Dept: OBGYN | Facility: CLINIC | Age: 44
End: 2024-04-11
Payer: COMMERCIAL

## 2024-04-11 VITALS
BODY MASS INDEX: 22.54 KG/M2 | DIASTOLIC BLOOD PRESSURE: 61 MMHG | OXYGEN SATURATION: 98 % | SYSTOLIC BLOOD PRESSURE: 117 MMHG | HEART RATE: 86 BPM | WEIGHT: 131.3 LBS

## 2024-04-11 DIAGNOSIS — N89.8 VAGINAL DISCHARGE: Primary | ICD-10-CM

## 2024-04-11 DIAGNOSIS — N73.0 PID (ACUTE PELVIC INFLAMMATORY DISEASE): ICD-10-CM

## 2024-04-11 DIAGNOSIS — N70.93 PYOSALPINX: ICD-10-CM

## 2024-04-11 LAB
CLUE CELLS: ABNORMAL
TRICHOMONAS, WET PREP: ABNORMAL
WBC'S/HIGH POWER FIELD, WET PREP: ABNORMAL
YEAST, WET PREP: ABNORMAL

## 2024-04-11 PROCEDURE — 99204 OFFICE O/P NEW MOD 45 MIN: CPT

## 2024-04-11 PROCEDURE — 87210 SMEAR WET MOUNT SALINE/INK: CPT

## 2024-04-11 PROCEDURE — 87591 N.GONORRHOEAE DNA AMP PROB: CPT

## 2024-04-11 PROCEDURE — 87491 CHLMYD TRACH DNA AMP PROBE: CPT

## 2024-04-12 LAB
C TRACH DNA SPEC QL PROBE+SIG AMP: NEGATIVE
N GONORRHOEA DNA SPEC QL NAA+PROBE: NEGATIVE

## 2024-04-12 NOTE — PROGRESS NOTES
CC: PID ED Follow-up  S: Magdalene is a 42 yo  who is here for ED Follow-up for PID/pyosalpinx/hydrosalpinx/hematosalpinx  -had PPTL after childbirth  -a mo earlier she had stomach pain  -had RUQ pain went to ED  -has some occurrences of constipation needing enema  -has normal periods usually not particularly heavy or painful it is light lasting 5 days and tapers off  -no pain since ED visit, has had some vaginal discharge, no odor or burning    O:/61 (BP Location: Left arm, Patient Position: Sitting, Cuff Size: Adult Regular)   Pulse 86   Wt 59.6 kg (131 lb 4.8 oz)   LMP 2024 (Within Days)   SpO2 98%   BMI 22.54 kg/m    No past medical history on file.  No past surgical history on file.  Current Outpatient Medications   Medication Sig Dispense Refill    amphetamine-dextroamphetamine (ADDERALL XR) 10 MG 24 hr capsule Take 10 mg by mouth every morning 10mg XR  with a 10 mg regular release in the morning      amphetamine-dextroamphetamine (ADDERALL) 10 MG tablet Take 10 mg by mouth every morning 10mg XR  with a 10 mg regular release  in the morning      amphetamine-dextroamphetamine (ADDERALL) 10 MG tablet Take 5-10 mg by mouth daily (with lunch) At noon      escitalopram (LEXAPRO) 10 MG tablet Take 10 mg by mouth daily      ibuprofen (ADVIL/MOTRIN) 400 MG tablet Take 1 tablet (400 mg) by mouth every 6 hours 8 tablet 0    valACYclovir (VALTREX) 1000 mg tablet Take 1,000 mg by mouth 2 times daily as needed (cold sores)      docusate sodium (COLACE) 100 MG capsule Take 2 capsules (200 mg) by mouth 2 times daily (Patient not taking: Reported on 2024)      doxycycline hyclate (VIBRAMYCIN) 100 MG capsule Take 1 capsule (100 mg) by mouth every 12 hours Take on empty stomach. No food or other medicine 2 hours before and 2 hours after doxycycline. (Patient not taking: Reported on 2024) 23 capsule 0    hydrocortisone 2.5 % cream Apply topically 2 times daily as needed for rash (face) (Patient not  taking: Reported on 2024)      magnesium citrate 1.745 GM/30ML solution Take 296 mLs by mouth Take 148 -296ml as needed for constipation. Don't use if you have loose stool. Alternate different stool softener to avoid toxicity. (Patient not taking: Reported on 2024)      metroNIDAZOLE (FLAGYL) 500 MG tablet Take 1 tablet (500 mg) by mouth 2 times daily (Patient not taking: Reported on 2024) 23 tablet 0    omeprazole (PRILOSEC) 40 MG DR capsule Take 1 capsule (40 mg) by mouth daily (Patient not taking: Reported on 2024) 30 capsule 0    oxyCODONE (ROXICODONE) 5 MG tablet Take 1 tablet (5 mg) by mouth every 6 hours as needed for severe pain or moderate pain (Patient not taking: Reported on 2024) 20 tablet 0    pantoprazole (PROTONIX) 40 MG EC tablet Take 1 tablet (40 mg) by mouth every morning (before breakfast) (Patient not taking: Reported on 2024) 4 tablet 0    polyethylene glycol (MIRALAX) 17 g packet Take 17 g by mouth 2 times daily Hold for loose stool (Patient not taking: Reported on 2024)      QUEtiapine (SEROQUEL) 25 MG tablet Take 12.5-75 mg by mouth nightly as needed (Patient not taking: Reported on 2024)       No current facility-administered medications for this visit.      No Known Allergies  Alert pleasant female NAD  RRR  Normal bp and pulse  ROS - for dysuria,constipation, itching, burning or pain    Narrative & Impression   EXAM: US PELVIS COMPLETE W TRANSVAGINAL AND DOPPLER LIMITED  LOCATION: [facility]  DATE: 2024     INDICATION: Pelvic pain  COMPARISON: CT abdomen and pelvis today.  TECHNIQUE: Transabdominal scans were performed. Endovaginal ultrasound was performed to better visualize the adnexa. Color flow with spectral Doppler and waveform analysis performed.     FINDINGS:     UTERUS: 8.7 x 5.6 x 4.6 cm. Anteverted uterus. Post  section. No uterine mass.     ENDOMETRIUM: 8 mm. Homogeneous endometrial texture. Arcuate  versus possible septate configuration. No fundal indentation visualized as would be expected with a bicornuate uterus.     RIGHT OVARY: 4.6 x 3.5 x 3.2 cm. A few small simple appearing thin-walled unilocular cysts of the right ovary consistent with follicles. Normal arterial and venous duplex flow of the right ovary demonstrated. Moderately dilated convoluted tubular   structure of the right adnexa containing echogenic material consistent with pyosalpinx versus hematosalpinx.     LEFT OVARY: 2.3 x 1.4 x 1.2 cm. Normal with color Doppler blood flow demonstrated. Arterial and venous Doppler waveforms difficult to demonstrate due to position. Mildly dilated and convoluted tubular anechoic structure of the left adnexa consistent with   hydrosalpinx.     No drainable abscess visualized. Small amount of free pelvic fluid.                                                                      IMPRESSION:    1.  Pyosalpinx versus hematosalpinx on the right. Hydrosalpinx on the left. Clinical correlation for signs of acute infection recommended.  2.  No drainable abscess.  3.  Arcuate versus septate configuration of the endometrium.     Component      Latest Ref Rng 4/11/2024  4:17 PM   Trichomonas      Absent  Absent    Yeast      Absent  Absent    Clue cells      Absent  Absent    WBCs/high power field      None  2+ !    Chlamydia Trachomatis PCR      Negative  Negative    Neisseria gonorrhoeae      Negative  Negative       Legend:  ! Abnormal    A/P:  1. Vaginal discharge  - Wet prep - Clinic Collect  - Chlamydia trachomatis/Neisseria gonorrhoeae by PCR - Clinic Collect    2. PID (acute pelvic inflammatory disease)  -recc Follow-up us  -rtc for any fever, abd pain, worsening sx  - US Pelvic Complete with Transvaginal; Future    3. Pyosalpinx  - US Pelvic Complete with Transvaginal; Future    Prefers exam next visit, recc TVUS Follow-up and meet specifically with MD KIRKLAND for potential need of surgery  Michelle Figueroa,  APRN CNP

## 2024-05-06 ENCOUNTER — ANCILLARY PROCEDURE (OUTPATIENT)
Dept: ULTRASOUND IMAGING | Facility: CLINIC | Age: 44
End: 2024-05-06
Payer: COMMERCIAL

## 2024-05-06 DIAGNOSIS — N73.0 PID (ACUTE PELVIC INFLAMMATORY DISEASE): ICD-10-CM

## 2024-05-06 DIAGNOSIS — N70.93 PYOSALPINX: ICD-10-CM

## 2024-05-06 PROCEDURE — 76830 TRANSVAGINAL US NON-OB: CPT | Performed by: OBSTETRICS & GYNECOLOGY

## 2024-05-06 PROCEDURE — 76856 US EXAM PELVIC COMPLETE: CPT | Performed by: OBSTETRICS & GYNECOLOGY

## 2024-10-06 ENCOUNTER — HEALTH MAINTENANCE LETTER (OUTPATIENT)
Age: 44
End: 2024-10-06